# Patient Record
Sex: MALE | Race: BLACK OR AFRICAN AMERICAN | Employment: OTHER | URBAN - METROPOLITAN AREA
[De-identification: names, ages, dates, MRNs, and addresses within clinical notes are randomized per-mention and may not be internally consistent; named-entity substitution may affect disease eponyms.]

---

## 2020-07-09 ENCOUNTER — TELEPHONE (OUTPATIENT)
Dept: GASTROENTEROLOGY | Facility: CLINIC | Age: 35
End: 2020-07-09

## 2020-07-09 ENCOUNTER — LAB ENCOUNTER (OUTPATIENT)
Dept: LAB | Age: 35
End: 2020-07-09
Attending: INTERNAL MEDICINE
Payer: COMMERCIAL

## 2020-07-09 ENCOUNTER — OFFICE VISIT (OUTPATIENT)
Dept: GASTROENTEROLOGY | Facility: CLINIC | Age: 35
End: 2020-07-09
Payer: COMMERCIAL

## 2020-07-09 VITALS
DIASTOLIC BLOOD PRESSURE: 83 MMHG | BODY MASS INDEX: 19.64 KG/M2 | WEIGHT: 153 LBS | HEART RATE: 76 BPM | HEIGHT: 74 IN | SYSTOLIC BLOOD PRESSURE: 124 MMHG

## 2020-07-09 DIAGNOSIS — K51.919 ULCERATIVE COLITIS WITH COMPLICATION, UNSPECIFIED LOCATION (HCC): ICD-10-CM

## 2020-07-09 DIAGNOSIS — R19.7 BLOODY DIARRHEA: ICD-10-CM

## 2020-07-09 DIAGNOSIS — K51.919 ULCERATIVE COLITIS WITH COMPLICATION, UNSPECIFIED LOCATION (HCC): Primary | ICD-10-CM

## 2020-07-09 DIAGNOSIS — R19.7 BLOODY DIARRHEA: Primary | ICD-10-CM

## 2020-07-09 LAB
ALBUMIN SERPL-MCNC: 3.3 G/DL (ref 3.4–5)
ALBUMIN/GLOB SERPL: 0.6 {RATIO} (ref 1–2)
ALP LIVER SERPL-CCNC: 60 U/L (ref 45–117)
ALT SERPL-CCNC: 16 U/L (ref 16–61)
ANION GAP SERPL CALC-SCNC: 3 MMOL/L (ref 0–18)
AST SERPL-CCNC: 12 U/L (ref 15–37)
BASOPHILS # BLD AUTO: 0.07 X10(3) UL (ref 0–0.2)
BASOPHILS NFR BLD AUTO: 0.6 %
BILIRUB SERPL-MCNC: 0.3 MG/DL (ref 0.1–2)
BUN BLD-MCNC: 7 MG/DL (ref 7–18)
BUN/CREAT SERPL: 5.2 (ref 10–20)
CALCIUM BLD-MCNC: 9.1 MG/DL (ref 8.5–10.1)
CHLORIDE SERPL-SCNC: 104 MMOL/L (ref 98–112)
CO2 SERPL-SCNC: 31 MMOL/L (ref 21–32)
CREAT BLD-MCNC: 1.35 MG/DL (ref 0.7–1.3)
CRP SERPL-MCNC: 5.17 MG/DL (ref ?–0.3)
DEPRECATED HBV CORE AB SER IA-ACNC: 42.7 NG/ML (ref 48–420)
DEPRECATED RDW RBC AUTO: 39.6 FL (ref 35.1–46.3)
EOSINOPHIL # BLD AUTO: 0.41 X10(3) UL (ref 0–0.7)
EOSINOPHIL NFR BLD AUTO: 3.4 %
ERYTHROCYTE [DISTWIDTH] IN BLOOD BY AUTOMATED COUNT: 14.2 % (ref 11–15)
GLOBULIN PLAS-MCNC: 5.1 G/DL (ref 2.8–4.4)
GLUCOSE BLD-MCNC: 81 MG/DL (ref 70–99)
HAV AB SER QL IA: NONREACTIVE
HBV CORE AB SERPL QL IA: NONREACTIVE
HBV SURFACE AB SER QL: REACTIVE
HBV SURFACE AB SERPL IA-ACNC: 31.51 MIU/ML
HBV SURFACE AG SER-ACNC: <0.1 [IU]/L
HBV SURFACE AG SERPL QL IA: NONREACTIVE
HCT VFR BLD AUTO: 41.5 % (ref 39–53)
HCV AB SERPL QL IA: NONREACTIVE
HGB BLD-MCNC: 12.9 G/DL (ref 13–17.5)
IMM GRANULOCYTES # BLD AUTO: 0.09 X10(3) UL (ref 0–1)
IMM GRANULOCYTES NFR BLD: 0.7 %
IRON SATURATION: 11 % (ref 20–50)
IRON SERPL-MCNC: 33 UG/DL (ref 65–175)
LYMPHOCYTES # BLD AUTO: 2.07 X10(3) UL (ref 1–4)
LYMPHOCYTES NFR BLD AUTO: 17.1 %
M PROTEIN MFR SERPL ELPH: 8.4 G/DL (ref 6.4–8.2)
MCH RBC QN AUTO: 24.1 PG (ref 26–34)
MCHC RBC AUTO-ENTMCNC: 31.1 G/DL (ref 31–37)
MCV RBC AUTO: 77.4 FL (ref 80–100)
MONOCYTES # BLD AUTO: 0.89 X10(3) UL (ref 0.1–1)
MONOCYTES NFR BLD AUTO: 7.3 %
NEUTROPHILS # BLD AUTO: 8.58 X10 (3) UL (ref 1.5–7.7)
NEUTROPHILS # BLD AUTO: 8.58 X10(3) UL (ref 1.5–7.7)
NEUTROPHILS NFR BLD AUTO: 70.9 %
OSMOLALITY SERPL CALC.SUM OF ELEC: 283 MOSM/KG (ref 275–295)
PATIENT FASTING Y/N/NP: NO
PLATELET # BLD AUTO: 509 10(3)UL (ref 150–450)
POTASSIUM SERPL-SCNC: 3.8 MMOL/L (ref 3.5–5.1)
RBC # BLD AUTO: 5.36 X10(6)UL (ref 4.3–5.7)
SODIUM SERPL-SCNC: 138 MMOL/L (ref 136–145)
TOTAL IRON BINDING CAPACITY: 305 UG/DL (ref 240–450)
TRANSFERRIN SERPL-MCNC: 205 MG/DL (ref 200–360)
TSI SER-ACNC: 0.7 MIU/ML (ref 0.36–3.74)
VIT B12 SERPL-MCNC: 446 PG/ML (ref 193–986)
WBC # BLD AUTO: 12.1 X10(3) UL (ref 4–11)

## 2020-07-09 PROCEDURE — 82306 VITAMIN D 25 HYDROXY: CPT

## 2020-07-09 PROCEDURE — 83540 ASSAY OF IRON: CPT

## 2020-07-09 PROCEDURE — 83993 ASSAY FOR CALPROTECTIN FECAL: CPT | Performed by: INTERNAL MEDICINE

## 2020-07-09 PROCEDURE — 99204 OFFICE O/P NEW MOD 45 MIN: CPT | Performed by: INTERNAL MEDICINE

## 2020-07-09 PROCEDURE — 82607 VITAMIN B-12: CPT

## 2020-07-09 PROCEDURE — 87493 C DIFF AMPLIFIED PROBE: CPT

## 2020-07-09 PROCEDURE — 86480 TB TEST CELL IMMUN MEASURE: CPT

## 2020-07-09 PROCEDURE — 80053 COMPREHEN METABOLIC PANEL: CPT

## 2020-07-09 PROCEDURE — 86704 HEP B CORE ANTIBODY TOTAL: CPT

## 2020-07-09 PROCEDURE — 86708 HEPATITIS A ANTIBODY: CPT

## 2020-07-09 PROCEDURE — 87427 SHIGA-LIKE TOXIN AG IA: CPT

## 2020-07-09 PROCEDURE — 84466 ASSAY OF TRANSFERRIN: CPT

## 2020-07-09 PROCEDURE — 86140 C-REACTIVE PROTEIN: CPT

## 2020-07-09 PROCEDURE — 87077 CULTURE AEROBIC IDENTIFY: CPT

## 2020-07-09 PROCEDURE — 36415 COLL VENOUS BLD VENIPUNCTURE: CPT

## 2020-07-09 PROCEDURE — 86803 HEPATITIS C AB TEST: CPT

## 2020-07-09 PROCEDURE — 84443 ASSAY THYROID STIM HORMONE: CPT

## 2020-07-09 PROCEDURE — 81401 MOPATH PROCEDURE LEVEL 2: CPT

## 2020-07-09 PROCEDURE — 86706 HEP B SURFACE ANTIBODY: CPT

## 2020-07-09 PROCEDURE — 87045 FECES CULTURE AEROBIC BACT: CPT

## 2020-07-09 PROCEDURE — 82657 ENZYME CELL ACTIVITY: CPT | Performed by: INTERNAL MEDICINE

## 2020-07-09 PROCEDURE — 87340 HEPATITIS B SURFACE AG IA: CPT

## 2020-07-09 PROCEDURE — 85025 COMPLETE CBC W/AUTO DIFF WBC: CPT

## 2020-07-09 PROCEDURE — 87046 STOOL CULTR AEROBIC BACT EA: CPT

## 2020-07-09 PROCEDURE — 82728 ASSAY OF FERRITIN: CPT

## 2020-07-09 RX ORDER — LICORICE,DEGLYCYRRHIZINATED
POWDER (GRAM) MISCELLANEOUS
COMMUNITY

## 2020-07-09 NOTE — PATIENT INSTRUCTIONS
1. Schedule colonoscopy with IV sedation[Diagnosis: bloody diarrhea]    2.  bowel prep from pharmacy (miralax/gatorade)    3. Continue all medications for procedure    4. Read all bowel prep instructions carefully    5.  AVOID seeds, nuts, popcorn, r

## 2020-07-09 NOTE — H&P
[address] [facility] [place] - Gastroenterology                                                                                                               Reason for consult: c History, Medications, Allergies, ROS:      Past Medical History:   Diagnosis Date   • Ulcerative colitis [facility])       Past Surgical History:   Procedure Laterality Date   • COLONOSCOPY  2015    @ [facility]      Family Hx:   Family History   Problem Relat cyanosis, clubbing or edema is evident. Neuro- Alert and interactive, and gross movements of extremities normal    Labs/Imaging:     Patient's pertinent labs and imaging were reviewed and discussed with patient today.       .  ASSESSMENT/PLAN:   Naida Lozano carefully    5. AVOID seeds, nuts, popcorn, raw fruits and vegetables (cooked is okay) for 2-3 days before procedure    6. Blood test/stool tests    7. [place] diet. No dairy or caffeine. Soft foods only.     Colonoscopy consent: I have discussed the risks, b

## 2020-07-09 NOTE — TELEPHONE ENCOUNTER
Scheduled for:  Colonoscopy 96066  Provider Name: Dr Zahida Landis  Date: Morgan Knutson 7/14/2020   Location: [facility]   Sedation: IV    Time: 12:30 am   Prep: split dose miralax/gatorade  Meds/Allergies Reconciled?: NKDA  Diagnosis with codes: bloody diarrhea R19.7, UC K59.919

## 2020-07-10 ENCOUNTER — TELEPHONE (OUTPATIENT)
Dept: GASTROENTEROLOGY | Facility: CLINIC | Age: 35
End: 2020-07-10

## 2020-07-10 PROBLEM — K51.811 OTHER ULCERATIVE COLITIS WITH RECTAL BLEEDING (HCC): Status: ACTIVE | Noted: 2020-07-10

## 2020-07-10 PROBLEM — R19.7 BLOODY DIARRHEA: Status: ACTIVE | Noted: 2020-07-10

## 2020-07-10 PROBLEM — K51.919 ULCERATIVE COLITIS WITH COMPLICATION (HCC): Status: ACTIVE | Noted: 2020-07-10

## 2020-07-10 LAB
25(OH)D3 SERPL-MCNC: 17 NG/ML (ref 30–100)
C DIFF TOX B STL QL: NEGATIVE

## 2020-07-10 RX ORDER — ERGOCALCIFEROL 1.25 MG/1
50000 CAPSULE ORAL
Qty: 8 CAPSULE | Refills: 0 | Status: SHIPPED | OUTPATIENT
Start: 2020-07-10 | End: 2020-08-29

## 2020-07-10 RX ORDER — PREDNISONE 20 MG/1
20 TABLET ORAL DAILY
Qty: 30 TABLET | Refills: 1 | COMMUNITY
Start: 2020-07-10

## 2020-07-10 NOTE — TELEPHONE ENCOUNTER
D/w patient--->negative cdiff. Likely UC flare. Start prednisone taper as below. Called it in to his pharmacy and went over instructions with patient. May make his acne worse. Low Vit D. Will give weekly high dose tablet x 8 weeks.     See me next week for

## 2020-07-11 LAB
M TB IFN-G CD4+ T-CELLS BLD-ACNC: 0.03 IU/ML
M TB TUBERC IFN-G BLD QL: NEGATIVE
M TB TUBERC IGNF/MITOGEN IGNF CONTROL: 7.42 IU/ML
QUANTIFERON TB1 MINUS NIL: -0.01 IU/ML
QUANTIFERON TB2 MINUS NIL: -0.01 IU/ML

## 2020-07-13 ENCOUNTER — LAB ENCOUNTER (OUTPATIENT)
Dept: LAB | Facility: HOSPITAL | Age: 35
End: 2020-07-13
Attending: INTERNAL MEDICINE
Payer: COMMERCIAL

## 2020-07-13 DIAGNOSIS — K51.919: ICD-10-CM

## 2020-07-13 DIAGNOSIS — R19.7 BLOODY DIARRHEA: ICD-10-CM

## 2020-07-13 DIAGNOSIS — Z01.818 PRE-OP TESTING: ICD-10-CM

## 2020-07-13 LAB
SARS-COV-2 RNA RESP QL NAA+PROBE: NOT DETECTED
THIOPURINE METHYLTRANSFERASE: 30 U/ML

## 2020-07-14 ENCOUNTER — HOSPITAL ENCOUNTER (OUTPATIENT)
Facility: HOSPITAL | Age: 35
Setting detail: HOSPITAL OUTPATIENT SURGERY
Discharge: HOME | End: 2020-07-14
Attending: INTERNAL MEDICINE | Admitting: INTERNAL MEDICINE
Payer: COMMERCIAL

## 2020-07-14 ENCOUNTER — TELEPHONE (OUTPATIENT)
Dept: GASTROENTEROLOGY | Facility: CLINIC | Age: 35
End: 2020-07-14

## 2020-07-14 DIAGNOSIS — K51.919: ICD-10-CM

## 2020-07-14 DIAGNOSIS — K51.911 ULCERATIVE COLITIS WITH RECTAL BLEEDING, UNSPECIFIED LOCATION (HCC): Primary | ICD-10-CM

## 2020-07-14 DIAGNOSIS — K51.919 ULCERATIVE COLITIS WITH COMPLICATION, UNSPECIFIED LOCATION (HCC): ICD-10-CM

## 2020-07-14 DIAGNOSIS — R19.7 BLOODY DIARRHEA: Primary | ICD-10-CM

## 2020-07-14 DIAGNOSIS — Z01.818 PRE-OP TESTING: ICD-10-CM

## 2020-07-14 PROBLEM — K52.9 COLITIS: Status: ACTIVE | Noted: 2020-07-10

## 2020-07-14 PROCEDURE — 0DBK8ZX EXCISION OF ASCENDING COLON, VIA NATURAL OR ARTIFICIAL OPENING ENDOSCOPIC, DIAGNOSTIC: ICD-10-PCS | Performed by: INTERNAL MEDICINE

## 2020-07-14 PROCEDURE — G0500 MOD SEDAT ENDO SERVICE >5YRS: HCPCS | Performed by: INTERNAL MEDICINE

## 2020-07-14 PROCEDURE — 0DBM8ZX EXCISION OF DESCENDING COLON, VIA NATURAL OR ARTIFICIAL OPENING ENDOSCOPIC, DIAGNOSTIC: ICD-10-PCS | Performed by: INTERNAL MEDICINE

## 2020-07-14 PROCEDURE — 45380 COLONOSCOPY AND BIOPSY: CPT | Performed by: INTERNAL MEDICINE

## 2020-07-14 RX ORDER — MIDAZOLAM HYDROCHLORIDE 1 MG/ML
1 INJECTION INTRAMUSCULAR; INTRAVENOUS EVERY 5 MIN PRN
Status: DISCONTINUED | OUTPATIENT
Start: 2020-07-14 | End: 2020-07-14

## 2020-07-14 RX ORDER — MIDAZOLAM HYDROCHLORIDE 1 MG/ML
INJECTION INTRAMUSCULAR; INTRAVENOUS
Status: DISCONTINUED | OUTPATIENT
Start: 2020-07-14 | End: 2020-07-14

## 2020-07-14 RX ORDER — SODIUM CHLORIDE, SODIUM LACTATE, POTASSIUM CHLORIDE, CALCIUM CHLORIDE 600; 310; 30; 20 MG/100ML; MG/100ML; MG/100ML; MG/100ML
INJECTION, SOLUTION INTRAVENOUS CONTINUOUS
Status: DISCONTINUED | OUTPATIENT
Start: 2020-07-14 | End: 2020-07-14

## 2020-07-14 RX ORDER — AZATHIOPRINE 50 MG/1
50 TABLET ORAL DAILY
Qty: 30 TABLET | Refills: 1 | Status: SHIPPED | OUTPATIENT
Start: 2020-07-14 | End: 2020-07-23

## 2020-07-14 RX ORDER — SODIUM CHLORIDE 0.9 % (FLUSH) 0.9 %
10 SYRINGE (ML) INJECTION AS NEEDED
Status: DISCONTINUED | OUTPATIENT
Start: 2020-07-14 | End: 2020-07-14

## 2020-07-14 NOTE — TELEPHONE ENCOUNTER
Dr. Falguni Echevarria-    Patient has [employer] O and they do not cover Remicade. Their preferred IV infusion is RENFLEXIS. If OK with you, please review and sign pended order, thank you.

## 2020-07-14 NOTE — OPERATIVE REPORT
COLONOSCOPY REPORT    Robertlatha LozanoKya     1985 Age 28year old   PCP Mimi Flores MD Endoscopist Cullen Werner MD     Date of procedure: 20    Procedure: Colonoscopy w/cold biopsy    Pre-operative diagnosis: Bloody Diarrhea    Post-o abrupt transition to normal mucosa in the mid-transverse colon and the remaining mucosa in the proximal transverse and ascending colon appears normal. There is mild granularity around the appendiceal orifice which is likely a cecal patch.      2. Diverticul

## 2020-07-14 NOTE — TELEPHONE ENCOUNTER
Patient wants to proceed with infliximab therapy. Risks/benefits discussed. Also start imuran 50mg/day, sent to pharmacy. Labs ordered to be done in 7-10 days. Continue prednisone taper.      RISK OF BIOLOGICS:  We discussed the risks and benefits of starti

## 2020-07-14 NOTE — H&P
History & Physical Examination    Patient Name: Cecilia Rayo  MRN: [id number]  CSN: [id number]  YOB: 1985    Diagnosis: bloody diarrhea, weight loss    predniSONE 20 MG Oral Tab, , Disp: 30 tablet, Rfl: 1, PRN  ergocalciferol 1.25 MG ([id number]

## 2020-07-15 ENCOUNTER — TELEPHONE (OUTPATIENT)
Dept: CASE MANAGEMENT | Age: 35
End: 2020-07-15

## 2020-07-15 VITALS
HEART RATE: 57 BPM | SYSTOLIC BLOOD PRESSURE: 102 MMHG | WEIGHT: 153 LBS | DIASTOLIC BLOOD PRESSURE: 68 MMHG | RESPIRATION RATE: 14 BRPM | OXYGEN SATURATION: 98 % | HEIGHT: 74 IN | TEMPERATURE: 98 F | BODY MASS INDEX: 19.64 KG/M2

## 2020-07-15 NOTE — TELEPHONE ENCOUNTER
Noted. Jaye De Luna , let me know if I need a letter for authorization of the biosimilar to infliximab.  THx

## 2020-07-15 NOTE — TELEPHONE ENCOUNTER
Hi Dr. Sierra Lebron is authorized to go to [address] [place] [address]. [facility] does not do prior auth's for medication.     Clinical staff will have to obtain authorization for patient's medication/injections    Thank you  Sher Reynolds

## 2020-07-16 ENCOUNTER — TELEPHONE (OUTPATIENT)
Dept: GASTROENTEROLOGY | Facility: CLINIC | Age: 35
End: 2020-07-16

## 2020-07-16 LAB — CALPROTECTIN STL-MCNT: >800 ΜG/G (ref ?–50)

## 2020-07-16 NOTE — TELEPHONE ENCOUNTER
Addended by: Abilio Don on: 1/22/2019 10:06 AM     Modules accepted: Orders DUPLICATE TE, please refer to TE from 7/14/2020 as biologic infusion medication PA's are NOT done by clinical staff. Message sent to the PPD PA Team to address.

## 2020-07-16 NOTE — TELEPHONE ENCOUNTER
D/w patient re: c-scope path showing IBD type colitis. Move forward with biologic, he understands it is a bio similar to infliximab. Pending approval. Risk/benefits discussed.     Chao Arteaga

## 2020-07-16 NOTE — TELEPHONE ENCOUNTER
PPD PA Team-    Please see referral documentation ([id number]) from managed care, please complete PA for medication RENFLEXIS as this is not done by clinical staff, thank you.

## 2020-07-17 NOTE — TELEPHONE ENCOUNTER
I have submitted the case via the [facility] website. Case is pending review. Supporting clinical information was attached electronically.

## 2020-07-21 ENCOUNTER — LAB ENCOUNTER (OUTPATIENT)
Dept: LAB | Age: 35
End: 2020-07-21
Attending: INTERNAL MEDICINE
Payer: COMMERCIAL

## 2020-07-21 DIAGNOSIS — K51.911 ULCERATIVE COLITIS WITH RECTAL BLEEDING, UNSPECIFIED LOCATION (HCC): ICD-10-CM

## 2020-07-21 LAB
ALBUMIN SERPL-MCNC: 3 G/DL (ref 3.4–5)
ALBUMIN/GLOB SERPL: 0.7 {RATIO} (ref 1–2)
ALP LIVER SERPL-CCNC: 67 U/L (ref 45–117)
ALT SERPL-CCNC: 34 U/L (ref 16–61)
ANION GAP SERPL CALC-SCNC: 6 MMOL/L (ref 0–18)
AST SERPL-CCNC: 13 U/L (ref 15–37)
BASOPHILS # BLD AUTO: 0.05 X10(3) UL (ref 0–0.2)
BASOPHILS NFR BLD AUTO: 0.3 %
BILIRUB SERPL-MCNC: 0.1 MG/DL (ref 0.1–2)
BUN BLD-MCNC: 9 MG/DL (ref 7–18)
BUN/CREAT SERPL: 8.7 (ref 10–20)
CALCIUM BLD-MCNC: 8.6 MG/DL (ref 8.5–10.1)
CHLORIDE SERPL-SCNC: 103 MMOL/L (ref 98–112)
CO2 SERPL-SCNC: 31 MMOL/L (ref 21–32)
CREAT BLD-MCNC: 1.04 MG/DL (ref 0.7–1.3)
DEPRECATED RDW RBC AUTO: 40.6 FL (ref 35.1–46.3)
EOSINOPHIL # BLD AUTO: 0 X10(3) UL (ref 0–0.7)
EOSINOPHIL NFR BLD AUTO: 0 %
ERYTHROCYTE [DISTWIDTH] IN BLOOD BY AUTOMATED COUNT: 14.7 % (ref 11–15)
GLOBULIN PLAS-MCNC: 4.1 G/DL (ref 2.8–4.4)
GLUCOSE BLD-MCNC: 131 MG/DL (ref 70–99)
HCT VFR BLD AUTO: 40.2 % (ref 39–53)
HGB BLD-MCNC: 12.7 G/DL (ref 13–17.5)
IMM GRANULOCYTES # BLD AUTO: 0.33 X10(3) UL (ref 0–1)
IMM GRANULOCYTES NFR BLD: 1.7 %
LYMPHOCYTES # BLD AUTO: 1.27 X10(3) UL (ref 1–4)
LYMPHOCYTES NFR BLD AUTO: 6.5 %
M PROTEIN MFR SERPL ELPH: 7.1 G/DL (ref 6.4–8.2)
MCH RBC QN AUTO: 24.5 PG (ref 26–34)
MCHC RBC AUTO-ENTMCNC: 31.6 G/DL (ref 31–37)
MCV RBC AUTO: 77.5 FL (ref 80–100)
MONOCYTES # BLD AUTO: 0.91 X10(3) UL (ref 0.1–1)
MONOCYTES NFR BLD AUTO: 4.7 %
NEUTROPHILS # BLD AUTO: 16.89 X10 (3) UL (ref 1.5–7.7)
NEUTROPHILS # BLD AUTO: 16.89 X10(3) UL (ref 1.5–7.7)
NEUTROPHILS NFR BLD AUTO: 86.8 %
OSMOLALITY SERPL CALC.SUM OF ELEC: 290 MOSM/KG (ref 275–295)
PATIENT FASTING Y/N/NP: YES
PLATELET # BLD AUTO: 577 10(3)UL (ref 150–450)
POTASSIUM SERPL-SCNC: 3.7 MMOL/L (ref 3.5–5.1)
RBC # BLD AUTO: 5.19 X10(6)UL (ref 4.3–5.7)
SODIUM SERPL-SCNC: 140 MMOL/L (ref 136–145)
WBC # BLD AUTO: 19.5 X10(3) UL (ref 4–11)

## 2020-07-21 PROCEDURE — 80053 COMPREHEN METABOLIC PANEL: CPT

## 2020-07-21 PROCEDURE — 85025 COMPLETE CBC W/AUTO DIFF WBC: CPT

## 2020-07-21 PROCEDURE — 36415 COLL VENOUS BLD VENIPUNCTURE: CPT

## 2020-07-21 NOTE — TELEPHONE ENCOUNTER
Lavell/Khushboo called to request further clinical information for prior auth. He needs to know if pt tried corticosteroids or if he had adverse effects. He also needs documentation of Castillo score. Please fax to [phone].

## 2020-07-21 NOTE — TELEPHONE ENCOUNTER
PPD PA TEAM-     Please note request for additional clinicals to be attached to this referral for Renflexis and help address. Additionally, the subscriber to the [address] is the patient/ Naida Boland (not Jeannie Michelle).      Was the referral and

## 2020-07-23 ENCOUNTER — TELEPHONE (OUTPATIENT)
Dept: GASTROENTEROLOGY | Facility: CLINIC | Age: 35
End: 2020-07-23

## 2020-07-23 DIAGNOSIS — K51.919 ULCERATIVE COLITIS WITH COMPLICATION, UNSPECIFIED LOCATION (HCC): Primary | ICD-10-CM

## 2020-07-23 RX ORDER — AZATHIOPRINE 50 MG/1
100 TABLET ORAL DAILY
Qty: 60 TABLET | Refills: 1 | Status: SHIPPED | OUTPATIENT
Start: 2020-07-23 | End: 2020-08-06

## 2020-07-23 NOTE — TELEPHONE ENCOUNTER
Reviewed case. Patient's ID number was loaded incorrectly in Epic under the spouse. Corrected ID number from [id number] to [id number]. Pulled pending authorization up via [employer] online and confirmed that it is loaded under the correct patient.

## 2020-07-23 NOTE — TELEPHONE ENCOUNTER
Faxed letter of medical necessity and all clinical related to UC to Lavell at [phone] as directed.

## 2020-07-23 NOTE — TELEPHONE ENCOUNTER
D/w Naida, he is on AZA 50mg/day, and prednisone is down to 20mg/day. Labs appear stable. Asked him to increased to Azathioprine 100mg/day. Repeat labs ordered. He has not heard about approval for Renflexis infusion (infliximab biosimilar).  I asked him

## 2020-07-23 NOTE — TELEPHONE ENCOUNTER
Corina Mojica MD   7/23/20 10:38 AM   Note      D/w Ron Valenzuela, he is on AZA 50mg/day, and prednisone is down to 20mg/day. Labs appear stable. Asked him to increased to Azathioprine 100mg/day.  Repeat labs ordered.     He has not heard about approval for [facility]

## 2020-07-23 NOTE — TELEPHONE ENCOUNTER
VV-    Please see Dr. Viri David letter generated w/ Castillo Score information for this PA, thank you.

## 2020-07-24 NOTE — TELEPHONE ENCOUNTER
Lavell [facility] Tung/Khushboo ([phone]) called and states he received the appeal paperwork and will review and reconsider his original decision. He states he will work on it now and get back to our office ASAP.      -Awaiting RENFLEXIS appeal determinati

## 2020-07-24 NOTE — TELEPHONE ENCOUNTER
Fax received from [facility] w/ reconsideration approval for RENFLEXIS biologic, valid from 7/20/2020-12/31/2020 for 6 visits. Authorization number: [id number]. Referral updated in Epic.      RENFLEXIS order and authorization letter faxed to infusion center

## 2020-07-24 NOTE — TELEPHONE ENCOUNTER
Denial letter received from [facility] for Renflexis stating the patient: needs to try corticosteroid another medication for long term condition of bowel cuaseing panful swelling before Renflexis Injection OR, the reason you cannot take corticosteroid. \"

## 2020-07-27 NOTE — TELEPHONE ENCOUNTER
Amalia/Khushboo called with approval for Renflexis. PA # is [id number] - good until 7/20/20 - 12/31/20.

## 2020-07-27 NOTE — TELEPHONE ENCOUNTER
Pt contacted and informed of RENFLEXIS approval and consented for our office to appeal with [employer] if denial continues to occur when re-certifying this medication in the future.  He will wait phone call from infusion center and if does not hear from them

## 2020-07-28 ENCOUNTER — TELEPHONE (OUTPATIENT)
Dept: HEMATOLOGY/ONCOLOGY | Facility: HOSPITAL | Age: 35
End: 2020-07-28

## 2020-07-28 PROBLEM — K51.911 ULCERATIVE COLITIS WITH RECTAL BLEEDING (HCC): Status: ACTIVE | Noted: 2020-07-28

## 2020-07-28 RX ORDER — DIPHENHYDRAMINE HYDROCHLORIDE 50 MG/ML
25 INJECTION INTRAMUSCULAR; INTRAVENOUS ONCE
Status: CANCELLED | OUTPATIENT
Start: 2020-07-28

## 2020-07-28 RX ORDER — ACETAMINOPHEN 325 MG/1
650 TABLET ORAL ONCE
Status: CANCELLED | OUTPATIENT
Start: 2020-07-28

## 2020-07-28 RX ORDER — DIPHENHYDRAMINE HCL 25 MG
25 CAPSULE ORAL ONCE
Status: CANCELLED | OUTPATIENT
Start: 2020-07-28

## 2020-07-30 NOTE — TELEPHONE ENCOUNTER
Pt has been scheduled for RENFLEXIS infusions:  Future Appointments   Date Time Provider Dusty Diaz   8/3/2020  8:30 AM EM [facility] INFRN 4 EM CHEMO EMO   8/17/2020  8:30 AM EM CC INFRN 4 [facility] CHEMO EMO   9/14/2020  8:30 AM EM CC INFRN 3 [facility] CHEMO EMO

## 2020-07-30 NOTE — TELEPHONE ENCOUNTER
Pt has been contacted by infusion center and scheduled for RENFLEXIS:  Future Appointments   Date Time Provider Dusty Diaz   8/3/2020  8:30 AM EM [facility] INFRN 4 [facility] CHEMO EMO   8/17/2020  8:30 AM EM CC INFRN 4 EM CHEMO EMO   9/14/2020  8:30 AM EM CC IN

## 2020-08-03 ENCOUNTER — OFFICE VISIT (OUTPATIENT)
Dept: HEMATOLOGY/ONCOLOGY | Facility: HOSPITAL | Age: 35
End: 2020-08-03
Attending: INTERNAL MEDICINE
Payer: COMMERCIAL

## 2020-08-03 VITALS
BODY MASS INDEX: 22 KG/M2 | HEART RATE: 65 BPM | TEMPERATURE: 98 F | SYSTOLIC BLOOD PRESSURE: 108 MMHG | RESPIRATION RATE: 18 BRPM | DIASTOLIC BLOOD PRESSURE: 70 MMHG | OXYGEN SATURATION: 99 % | WEIGHT: 167.63 LBS

## 2020-08-03 DIAGNOSIS — K51.911 ULCERATIVE COLITIS WITH RECTAL BLEEDING (HCC): Primary | ICD-10-CM

## 2020-08-03 PROCEDURE — 96365 THER/PROPH/DIAG IV INF INIT: CPT

## 2020-08-03 PROCEDURE — 96366 THER/PROPH/DIAG IV INF ADDON: CPT

## 2020-08-03 RX ORDER — DIPHENHYDRAMINE HCL 25 MG
CAPSULE ORAL
Status: COMPLETED
Start: 2020-08-03 | End: 2020-08-03

## 2020-08-03 RX ORDER — ACETAMINOPHEN 325 MG/1
650 TABLET ORAL ONCE
Status: CANCELLED | OUTPATIENT
Start: 2020-08-17

## 2020-08-03 RX ORDER — DIPHENHYDRAMINE HCL 25 MG
25 CAPSULE ORAL ONCE
Status: COMPLETED | OUTPATIENT
Start: 2020-08-03 | End: 2020-08-03

## 2020-08-03 RX ORDER — DIPHENHYDRAMINE HYDROCHLORIDE 50 MG/ML
25 INJECTION INTRAMUSCULAR; INTRAVENOUS ONCE
Status: CANCELLED | OUTPATIENT
Start: 2020-08-17

## 2020-08-03 RX ORDER — ACETAMINOPHEN 325 MG/1
650 TABLET ORAL ONCE
Status: COMPLETED | OUTPATIENT
Start: 2020-08-03 | End: 2020-08-03

## 2020-08-03 RX ORDER — ACETAMINOPHEN 325 MG/1
TABLET ORAL
Status: COMPLETED
Start: 2020-08-03 | End: 2020-08-03

## 2020-08-03 RX ORDER — DIPHENHYDRAMINE HCL 25 MG
25 CAPSULE ORAL ONCE
Status: CANCELLED | OUTPATIENT
Start: 2020-08-17

## 2020-08-03 RX ORDER — DIPHENHYDRAMINE HYDROCHLORIDE 50 MG/ML
25 INJECTION INTRAMUSCULAR; INTRAVENOUS ONCE
Status: DISCONTINUED | OUTPATIENT
Start: 2020-08-03 | End: 2020-08-03

## 2020-08-03 RX ADMIN — ACETAMINOPHEN 650 MG: 325 TABLET ORAL at 08:29:00

## 2020-08-03 RX ADMIN — DIPHENHYDRAMINE HCL 25 MG: 25 MG CAPSULE ORAL at 08:29:00

## 2020-08-03 NOTE — PROGRESS NOTES
Pares to infusion for Renflexis Week 0 for ulcerative colitis (Week 0, 2, 6 then Q8wks). TB neg, Hepatitis neg, see lab results.  Reviewed Krys Cabello patient education regarding managing ulcerative colitis and printed patient education brochure from Renflexis w

## 2020-08-06 ENCOUNTER — TELEPHONE (OUTPATIENT)
Dept: GASTROENTEROLOGY | Facility: CLINIC | Age: 35
End: 2020-08-06

## 2020-08-06 RX ORDER — AZATHIOPRINE 50 MG/1
100 TABLET ORAL DAILY
Qty: 60 TABLET | Refills: 3 | Status: SHIPPED | OUTPATIENT
Start: 2020-08-06 | End: 2020-09-05

## 2020-08-06 NOTE — TELEPHONE ENCOUNTER
Spoke to patient:    1. He is on prednisone 15mg a day and I asked him to continue taper, he understands  2. Azathioprine at 100mg/day, refilled med and sent to pharmacy  3.  First dose on infliximab biosimilar was on 8/3/20, he has another infusion in abou

## 2020-08-06 NOTE — TELEPHONE ENCOUNTER
Pt contacted as no SHARIF on file to speak to his wife, Jannet Navarrete, he states he gives verbal consent to speak to her regarding his medical care.  He is aware an SHARIF will be mailed to his home address that he needs to sign and mail back in order to allow his wife

## 2020-08-06 NOTE — TELEPHONE ENCOUNTER
Pts wife called with questions about medication:     Current Outpatient Medications:   •  azathioprine 50 MG Oral Tab, Take 2 tablets (100 mg total) by mouth daily. , Disp: 60 tablet, Rfl: 1    Please call.

## 2020-08-17 ENCOUNTER — OFFICE VISIT (OUTPATIENT)
Dept: HEMATOLOGY/ONCOLOGY | Facility: HOSPITAL | Age: 35
End: 2020-08-17
Attending: INTERNAL MEDICINE
Payer: COMMERCIAL

## 2020-08-17 VITALS
TEMPERATURE: 98 F | RESPIRATION RATE: 18 BRPM | HEART RATE: 67 BPM | WEIGHT: 170 LBS | SYSTOLIC BLOOD PRESSURE: 112 MMHG | DIASTOLIC BLOOD PRESSURE: 66 MMHG | OXYGEN SATURATION: 99 % | BODY MASS INDEX: 22 KG/M2

## 2020-08-17 DIAGNOSIS — K51.911 ULCERATIVE COLITIS WITH RECTAL BLEEDING (HCC): Primary | ICD-10-CM

## 2020-08-17 PROCEDURE — 96365 THER/PROPH/DIAG IV INF INIT: CPT

## 2020-08-17 PROCEDURE — 96366 THER/PROPH/DIAG IV INF ADDON: CPT

## 2020-08-17 RX ORDER — DIPHENHYDRAMINE HCL 25 MG
CAPSULE ORAL
Status: COMPLETED
Start: 2020-08-17 | End: 2020-08-17

## 2020-08-17 RX ORDER — ACETAMINOPHEN 325 MG/1
650 TABLET ORAL ONCE
Status: CANCELLED | OUTPATIENT
Start: 2020-09-14

## 2020-08-17 RX ORDER — DIPHENHYDRAMINE HYDROCHLORIDE 50 MG/ML
25 INJECTION INTRAMUSCULAR; INTRAVENOUS ONCE
Status: CANCELLED | OUTPATIENT
Start: 2020-09-14

## 2020-08-17 RX ORDER — DIPHENHYDRAMINE HCL 25 MG
25 CAPSULE ORAL ONCE
Status: CANCELLED | OUTPATIENT
Start: 2020-09-14

## 2020-08-17 RX ORDER — ACETAMINOPHEN 325 MG/1
TABLET ORAL
Status: COMPLETED
Start: 2020-08-17 | End: 2020-08-17

## 2020-08-17 RX ORDER — DIPHENHYDRAMINE HCL 25 MG
25 CAPSULE ORAL ONCE
Status: COMPLETED | OUTPATIENT
Start: 2020-08-17 | End: 2020-08-17

## 2020-08-17 RX ORDER — ACETAMINOPHEN 325 MG/1
650 TABLET ORAL ONCE
Status: COMPLETED | OUTPATIENT
Start: 2020-08-17 | End: 2020-08-17

## 2020-08-17 RX ADMIN — DIPHENHYDRAMINE HCL 25 MG: 25 MG CAPSULE ORAL at 08:56:00

## 2020-08-17 RX ADMIN — ACETAMINOPHEN 650 MG: 325 TABLET ORAL at 08:56:00

## 2020-08-17 NOTE — PROGRESS NOTES
Presents for the 2nd dose of renflexis 2 weeks apart from the first dose. Oral premedications given, PIV established with brisk blood return noted. Having less stools daily. renflexis infused with no s/s of adverse reaction noted. Post vs wnl.  PIV remove

## 2020-09-14 ENCOUNTER — OFFICE VISIT (OUTPATIENT)
Dept: HEMATOLOGY/ONCOLOGY | Facility: HOSPITAL | Age: 35
End: 2020-09-14
Attending: INTERNAL MEDICINE
Payer: COMMERCIAL

## 2020-09-14 VITALS
SYSTOLIC BLOOD PRESSURE: 106 MMHG | RESPIRATION RATE: 16 BRPM | BODY MASS INDEX: 23 KG/M2 | OXYGEN SATURATION: 98 % | HEART RATE: 62 BPM | WEIGHT: 176.63 LBS | TEMPERATURE: 98 F | DIASTOLIC BLOOD PRESSURE: 64 MMHG

## 2020-09-14 DIAGNOSIS — K51.911 ULCERATIVE COLITIS WITH RECTAL BLEEDING (HCC): Primary | ICD-10-CM

## 2020-09-14 PROCEDURE — 96366 THER/PROPH/DIAG IV INF ADDON: CPT

## 2020-09-14 PROCEDURE — 96365 THER/PROPH/DIAG IV INF INIT: CPT

## 2020-09-14 RX ORDER — DIPHENHYDRAMINE HYDROCHLORIDE 50 MG/ML
25 INJECTION INTRAMUSCULAR; INTRAVENOUS ONCE
Status: CANCELLED | OUTPATIENT
Start: 2020-11-09

## 2020-09-14 RX ORDER — DIPHENHYDRAMINE HCL 25 MG
CAPSULE ORAL
Status: COMPLETED
Start: 2020-09-14 | End: 2020-09-14

## 2020-09-14 RX ORDER — DIPHENHYDRAMINE HCL 25 MG
25 CAPSULE ORAL ONCE
Status: CANCELLED | OUTPATIENT
Start: 2020-11-09

## 2020-09-14 RX ORDER — ACETAMINOPHEN 325 MG/1
650 TABLET ORAL ONCE
Status: CANCELLED | OUTPATIENT
Start: 2020-11-09

## 2020-09-14 RX ORDER — DIPHENHYDRAMINE HCL 25 MG
25 CAPSULE ORAL ONCE
Status: COMPLETED | OUTPATIENT
Start: 2020-09-14 | End: 2020-09-14

## 2020-09-14 RX ORDER — ACETAMINOPHEN 325 MG/1
TABLET ORAL
Status: COMPLETED
Start: 2020-09-14 | End: 2020-09-14

## 2020-09-14 RX ORDER — ACETAMINOPHEN 325 MG/1
650 TABLET ORAL ONCE
Status: COMPLETED | OUTPATIENT
Start: 2020-09-14 | End: 2020-09-14

## 2020-09-14 RX ADMIN — ACETAMINOPHEN 650 MG: 325 TABLET ORAL at 08:39:00

## 2020-09-14 RX ADMIN — DIPHENHYDRAMINE HCL 25 MG: 25 MG CAPSULE ORAL at 08:39:00

## 2020-09-14 NOTE — PROGRESS NOTES
Ambulatory to dept for week 6 Reneflexis. Oral premedications given, PIV established with brisk blood return noted. Renflexis infused with no s/s of adverse reaction noted. Post vs wnl. PIV removed, site covered with 2x2g and coban. No bleeding noted.

## 2020-09-28 NOTE — TELEPHONE ENCOUNTER
Dr. Rosina Bernabe-    Please see VV/PPD PA Team message below. What is the Castillo score for this patient? Please advise, thank you. Last seen 6/30/20.

## 2020-11-09 ENCOUNTER — OFFICE VISIT (OUTPATIENT)
Dept: HEMATOLOGY/ONCOLOGY | Facility: HOSPITAL | Age: 35
End: 2020-11-09
Attending: INTERNAL MEDICINE
Payer: COMMERCIAL

## 2020-11-09 VITALS
HEART RATE: 64 BPM | DIASTOLIC BLOOD PRESSURE: 70 MMHG | OXYGEN SATURATION: 99 % | SYSTOLIC BLOOD PRESSURE: 120 MMHG | BODY MASS INDEX: 23 KG/M2 | WEIGHT: 179 LBS | RESPIRATION RATE: 16 BRPM | TEMPERATURE: 99 F

## 2020-11-09 DIAGNOSIS — K51.911 ULCERATIVE COLITIS WITH RECTAL BLEEDING (HCC): Primary | ICD-10-CM

## 2020-11-09 PROCEDURE — 96366 THER/PROPH/DIAG IV INF ADDON: CPT

## 2020-11-09 PROCEDURE — 96365 THER/PROPH/DIAG IV INF INIT: CPT

## 2020-11-09 RX ORDER — DIPHENHYDRAMINE HCL 25 MG
CAPSULE ORAL
Status: COMPLETED
Start: 2020-11-09 | End: 2020-11-09

## 2020-11-09 RX ORDER — ACETAMINOPHEN 325 MG/1
TABLET ORAL
Status: COMPLETED
Start: 2020-11-09 | End: 2020-11-09

## 2020-11-09 RX ORDER — DIPHENHYDRAMINE HCL 25 MG
25 CAPSULE ORAL ONCE
Status: CANCELLED | OUTPATIENT
Start: 2021-01-04

## 2020-11-09 RX ORDER — ACETAMINOPHEN 325 MG/1
650 TABLET ORAL ONCE
Status: CANCELLED | OUTPATIENT
Start: 2021-01-04

## 2020-11-09 RX ORDER — DIPHENHYDRAMINE HYDROCHLORIDE 50 MG/ML
25 INJECTION INTRAMUSCULAR; INTRAVENOUS ONCE
Status: CANCELLED | OUTPATIENT
Start: 2021-01-04

## 2020-11-09 RX ORDER — DIPHENHYDRAMINE HCL 25 MG
25 CAPSULE ORAL ONCE
Status: COMPLETED | OUTPATIENT
Start: 2020-11-09 | End: 2020-11-09

## 2020-11-09 RX ORDER — ACETAMINOPHEN 325 MG/1
650 TABLET ORAL ONCE
Status: COMPLETED | OUTPATIENT
Start: 2020-11-09 | End: 2020-11-09

## 2020-11-09 RX ADMIN — DIPHENHYDRAMINE HCL 25 MG: 25 MG CAPSULE ORAL at 08:21:00

## 2020-11-09 RX ADMIN — ACETAMINOPHEN 650 MG: 325 TABLET ORAL at 08:21:00

## 2020-11-09 NOTE — PROGRESS NOTES
Presents for renflexis ordered every 8 weeks. Oral premedications given, PIV established with brisk blood return noted. Tolerating tx, no concerns voiced. Renflexis infused with no s/s of adverse reaction noted. Post vs wnl.  PIV removed, site covered wit

## 2020-12-30 ENCOUNTER — TELEPHONE (OUTPATIENT)
Dept: GASTROENTEROLOGY | Facility: CLINIC | Age: 35
End: 2020-12-30

## 2021-01-04 ENCOUNTER — OFFICE VISIT (OUTPATIENT)
Dept: HEMATOLOGY/ONCOLOGY | Facility: HOSPITAL | Age: 36
End: 2021-01-04
Attending: INTERNAL MEDICINE
Payer: COMMERCIAL

## 2021-01-04 VITALS
RESPIRATION RATE: 16 BRPM | WEIGHT: 176.81 LBS | BODY MASS INDEX: 23 KG/M2 | OXYGEN SATURATION: 100 % | HEART RATE: 61 BPM | TEMPERATURE: 98 F | SYSTOLIC BLOOD PRESSURE: 104 MMHG | DIASTOLIC BLOOD PRESSURE: 66 MMHG

## 2021-01-04 DIAGNOSIS — K51.919 ULCERATIVE COLITIS WITH COMPLICATION, UNSPECIFIED LOCATION (HCC): ICD-10-CM

## 2021-01-04 DIAGNOSIS — K51.911 ULCERATIVE COLITIS WITH RECTAL BLEEDING (HCC): Primary | ICD-10-CM

## 2021-01-04 LAB
ALBUMIN SERPL-MCNC: 3.6 G/DL (ref 3.4–5)
ALBUMIN/GLOB SERPL: 0.8 {RATIO} (ref 1–2)
ALP LIVER SERPL-CCNC: 67 U/L
ALT SERPL-CCNC: 21 U/L
ANION GAP SERPL CALC-SCNC: 5 MMOL/L (ref 0–18)
AST SERPL-CCNC: 20 U/L (ref 15–37)
BASOPHILS # BLD AUTO: 0.04 X10(3) UL (ref 0–0.2)
BASOPHILS NFR BLD AUTO: 0.5 %
BILIRUB SERPL-MCNC: 0.3 MG/DL (ref 0.1–2)
BUN BLD-MCNC: 17 MG/DL (ref 7–18)
BUN/CREAT SERPL: 12.9 (ref 10–20)
CALCIUM BLD-MCNC: 8.7 MG/DL (ref 8.5–10.1)
CHLORIDE SERPL-SCNC: 106 MMOL/L (ref 98–112)
CO2 SERPL-SCNC: 29 MMOL/L (ref 21–32)
CREAT BLD-MCNC: 1.32 MG/DL
DEPRECATED RDW RBC AUTO: 46.5 FL (ref 35.1–46.3)
EOSINOPHIL # BLD AUTO: 0.16 X10(3) UL (ref 0–0.7)
EOSINOPHIL NFR BLD AUTO: 2 %
ERYTHROCYTE [DISTWIDTH] IN BLOOD BY AUTOMATED COUNT: 15.3 % (ref 11–15)
GLOBULIN PLAS-MCNC: 4.3 G/DL (ref 2.8–4.4)
GLUCOSE BLD-MCNC: 96 MG/DL (ref 70–99)
HCT VFR BLD AUTO: 42.5 %
HGB BLD-MCNC: 13.6 G/DL
IMM GRANULOCYTES # BLD AUTO: 0.03 X10(3) UL (ref 0–1)
IMM GRANULOCYTES NFR BLD: 0.4 %
LYMPHOCYTES # BLD AUTO: 2.05 X10(3) UL (ref 1–4)
LYMPHOCYTES NFR BLD AUTO: 25.6 %
M PROTEIN MFR SERPL ELPH: 7.9 G/DL (ref 6.4–8.2)
MCH RBC QN AUTO: 26.5 PG (ref 26–34)
MCHC RBC AUTO-ENTMCNC: 32 G/DL (ref 31–37)
MCV RBC AUTO: 82.7 FL
MONOCYTES # BLD AUTO: 0.63 X10(3) UL (ref 0.1–1)
MONOCYTES NFR BLD AUTO: 7.9 %
NEUTROPHILS # BLD AUTO: 5.09 X10 (3) UL (ref 1.5–7.7)
NEUTROPHILS # BLD AUTO: 5.09 X10(3) UL (ref 1.5–7.7)
NEUTROPHILS NFR BLD AUTO: 63.6 %
OSMOLALITY SERPL CALC.SUM OF ELEC: 291 MOSM/KG (ref 275–295)
PLATELET # BLD AUTO: 378 10(3)UL (ref 150–450)
POTASSIUM SERPL-SCNC: 3.8 MMOL/L (ref 3.5–5.1)
RBC # BLD AUTO: 5.14 X10(6)UL
SODIUM SERPL-SCNC: 140 MMOL/L (ref 136–145)
WBC # BLD AUTO: 8 X10(3) UL (ref 4–11)

## 2021-01-04 PROCEDURE — 96366 THER/PROPH/DIAG IV INF ADDON: CPT

## 2021-01-04 PROCEDURE — 85025 COMPLETE CBC W/AUTO DIFF WBC: CPT

## 2021-01-04 PROCEDURE — 96365 THER/PROPH/DIAG IV INF INIT: CPT

## 2021-01-04 PROCEDURE — 80053 COMPREHEN METABOLIC PANEL: CPT

## 2021-01-04 RX ORDER — ACETAMINOPHEN 325 MG/1
TABLET ORAL
Status: COMPLETED
Start: 2021-01-04 | End: 2021-01-04

## 2021-01-04 RX ORDER — DIPHENHYDRAMINE HCL 25 MG
25 CAPSULE ORAL ONCE
Status: CANCELLED | OUTPATIENT
Start: 2021-03-01

## 2021-01-04 RX ORDER — ACETAMINOPHEN 325 MG/1
650 TABLET ORAL ONCE
Status: COMPLETED | OUTPATIENT
Start: 2021-01-04 | End: 2021-01-04

## 2021-01-04 RX ORDER — ACETAMINOPHEN 325 MG/1
650 TABLET ORAL ONCE
Status: CANCELLED | OUTPATIENT
Start: 2021-03-01

## 2021-01-04 RX ORDER — DIPHENHYDRAMINE HCL 25 MG
25 CAPSULE ORAL ONCE
Status: COMPLETED | OUTPATIENT
Start: 2021-01-04 | End: 2021-01-04

## 2021-01-04 RX ORDER — DIPHENHYDRAMINE HYDROCHLORIDE 50 MG/ML
25 INJECTION INTRAMUSCULAR; INTRAVENOUS ONCE
Status: CANCELLED | OUTPATIENT
Start: 2021-03-01

## 2021-01-04 RX ORDER — DIPHENHYDRAMINE HCL 25 MG
CAPSULE ORAL
Status: COMPLETED
Start: 2021-01-04 | End: 2021-01-04

## 2021-01-04 RX ADMIN — ACETAMINOPHEN 650 MG: 325 TABLET ORAL at 08:56:00

## 2021-01-04 RX ADMIN — DIPHENHYDRAMINE HCL 25 MG: 25 MG CAPSULE ORAL at 08:56:00

## 2021-01-04 NOTE — PROGRESS NOTES
Presents for renflexis ordered every 8 weeks. Oral premedications given, PIV established with brisk blood return noted. Outstanding labs from Dr. Sylvia Saunders collected. Tolerating tx, no concerns voiced. Denies any recent infections or fevers.    Renflexis infu

## 2021-01-05 ENCOUNTER — TELEPHONE (OUTPATIENT)
Dept: HEMATOLOGY/ONCOLOGY | Facility: HOSPITAL | Age: 36
End: 2021-01-05

## 2021-01-05 NOTE — TELEPHONE ENCOUNTER
Spoke to Naida today to get an update on his condition post infusion for renflexis. Patient sates that he is stable and continue to do well. New labs requested and drawn.  See attached   Fax condmarthao update for [facility] @ [phone]  Phone [phone]-

## 2021-01-08 ENCOUNTER — TELEPHONE (OUTPATIENT)
Dept: GASTROENTEROLOGY | Facility: CLINIC | Age: 36
End: 2021-01-08

## 2021-01-08 NOTE — TELEPHONE ENCOUNTER
D/w patient---labs appear stable. Cr slightly up but overall stable. He remains on remicade biosimilar and imuran. We discussed he is at risk of covid given immunosuppression. Wear masks. wash hands.      GI Clinical Staff:   Can you please schedule appt for

## 2021-02-11 ENCOUNTER — OFFICE VISIT (OUTPATIENT)
Dept: GASTROENTEROLOGY | Facility: CLINIC | Age: 36
End: 2021-02-11
Payer: COMMERCIAL

## 2021-02-11 VITALS
SYSTOLIC BLOOD PRESSURE: 111 MMHG | HEART RATE: 76 BPM | DIASTOLIC BLOOD PRESSURE: 76 MMHG | HEIGHT: 74 IN | WEIGHT: 185 LBS | TEMPERATURE: 99 F | BODY MASS INDEX: 23.74 KG/M2

## 2021-02-11 DIAGNOSIS — K51.811 OTHER ULCERATIVE COLITIS WITH RECTAL BLEEDING (HCC): Primary | ICD-10-CM

## 2021-02-11 PROBLEM — K52.9 COLITIS: Status: RESOLVED | Noted: 2020-07-10 | Resolved: 2021-02-11

## 2021-02-11 PROBLEM — R19.7 BLOODY DIARRHEA: Status: RESOLVED | Noted: 2020-07-10 | Resolved: 2021-02-11

## 2021-02-11 PROCEDURE — 3074F SYST BP LT 130 MM HG: CPT | Performed by: INTERNAL MEDICINE

## 2021-02-11 PROCEDURE — 3008F BODY MASS INDEX DOCD: CPT | Performed by: INTERNAL MEDICINE

## 2021-02-11 PROCEDURE — 99214 OFFICE O/P EST MOD 30 MIN: CPT | Performed by: INTERNAL MEDICINE

## 2021-02-11 PROCEDURE — 3078F DIAST BP <80 MM HG: CPT | Performed by: INTERNAL MEDICINE

## 2021-02-11 RX ORDER — AZATHIOPRINE 50 MG/1
TABLET ORAL
COMMUNITY
Start: 2020-11-27

## 2021-02-11 NOTE — PROGRESS NOTES
[address] [facility] Follow-up Visit    Daniela months  -Having some acne issues as well and was given doxycycline oral by his PCP     Denies any dizziness, chest pain, shortness of breath. No vomiting. Able to tolerate oral intake.   His wife is accompanying him today.     Prior endoscopies:  Colonosc breath  CARDIOVASCULAR:  negative for  chest pain  GASTROINTESTINAL:  see HPI  GENITOURINARY:  negative for dysuria  INTEGUMENT/BREAST:  SKIN:  negative for  rash  ALLERGIC/IMMUNOLOGIC:  negative for hay fever  ENDOCRINE:  negative for cold intolerance and encounter. Meds This Visit:  Requested Prescriptions      No prescriptions requested or ordered in this encounter       Imaging & Referrals:  None     2/11/2021    YOLI Johnson MD  Pager: [phone]        This note was partially prepared using

## 2021-03-01 ENCOUNTER — OFFICE VISIT (OUTPATIENT)
Dept: HEMATOLOGY/ONCOLOGY | Facility: HOSPITAL | Age: 36
End: 2021-03-01
Attending: INTERNAL MEDICINE
Payer: COMMERCIAL

## 2021-03-01 VITALS
DIASTOLIC BLOOD PRESSURE: 77 MMHG | OXYGEN SATURATION: 100 % | RESPIRATION RATE: 16 BRPM | SYSTOLIC BLOOD PRESSURE: 116 MMHG | TEMPERATURE: 98 F | HEART RATE: 59 BPM

## 2021-03-01 DIAGNOSIS — K51.811 OTHER ULCERATIVE COLITIS WITH RECTAL BLEEDING (HCC): ICD-10-CM

## 2021-03-01 DIAGNOSIS — K51.911 ULCERATIVE COLITIS WITH RECTAL BLEEDING (HCC): Primary | ICD-10-CM

## 2021-03-01 LAB
ALBUMIN SERPL-MCNC: 3.8 G/DL (ref 3.4–5)
ALBUMIN/GLOB SERPL: 1 {RATIO} (ref 1–2)
ALP LIVER SERPL-CCNC: 66 U/L
ALT SERPL-CCNC: 19 U/L
ANION GAP SERPL CALC-SCNC: 3 MMOL/L (ref 0–18)
AST SERPL-CCNC: 19 U/L (ref 15–37)
BASOPHILS # BLD AUTO: 0.05 X10(3) UL (ref 0–0.2)
BASOPHILS NFR BLD AUTO: 0.7 %
BILIRUB SERPL-MCNC: 0.4 MG/DL (ref 0.1–2)
BUN BLD-MCNC: 12 MG/DL (ref 7–18)
BUN/CREAT SERPL: 10.4 (ref 10–20)
CALCIUM BLD-MCNC: 8 MG/DL (ref 8.5–10.1)
CHLORIDE SERPL-SCNC: 104 MMOL/L (ref 98–112)
CO2 SERPL-SCNC: 32 MMOL/L (ref 21–32)
CREAT BLD-MCNC: 1.15 MG/DL
CRP SERPL-MCNC: 0.45 MG/DL (ref ?–0.3)
DEPRECATED RDW RBC AUTO: 42.8 FL (ref 35.1–46.3)
EOSINOPHIL # BLD AUTO: 0.17 X10(3) UL (ref 0–0.7)
EOSINOPHIL NFR BLD AUTO: 2.2 %
ERYTHROCYTE [DISTWIDTH] IN BLOOD BY AUTOMATED COUNT: 14 % (ref 11–15)
GLOBULIN PLAS-MCNC: 4 G/DL (ref 2.8–4.4)
GLUCOSE BLD-MCNC: 102 MG/DL (ref 70–99)
HCT VFR BLD AUTO: 43.1 %
HGB BLD-MCNC: 13.9 G/DL
IMM GRANULOCYTES # BLD AUTO: 0.02 X10(3) UL (ref 0–1)
IMM GRANULOCYTES NFR BLD: 0.3 %
LYMPHOCYTES # BLD AUTO: 2.17 X10(3) UL (ref 1–4)
LYMPHOCYTES NFR BLD AUTO: 28.6 %
M PROTEIN MFR SERPL ELPH: 7.8 G/DL (ref 6.4–8.2)
MCH RBC QN AUTO: 26.8 PG (ref 26–34)
MCHC RBC AUTO-ENTMCNC: 32.3 G/DL (ref 31–37)
MCV RBC AUTO: 83 FL
MONOCYTES # BLD AUTO: 0.65 X10(3) UL (ref 0.1–1)
MONOCYTES NFR BLD AUTO: 8.6 %
NEUTROPHILS # BLD AUTO: 4.53 X10 (3) UL (ref 1.5–7.7)
NEUTROPHILS # BLD AUTO: 4.53 X10(3) UL (ref 1.5–7.7)
NEUTROPHILS NFR BLD AUTO: 59.6 %
OSMOLALITY SERPL CALC.SUM OF ELEC: 288 MOSM/KG (ref 275–295)
PATIENT FASTING Y/N/NP: NO
PLATELET # BLD AUTO: 287 10(3)UL (ref 150–450)
POTASSIUM SERPL-SCNC: 3.7 MMOL/L (ref 3.5–5.1)
RBC # BLD AUTO: 5.19 X10(6)UL
SODIUM SERPL-SCNC: 139 MMOL/L (ref 136–145)
WBC # BLD AUTO: 7.6 X10(3) UL (ref 4–11)

## 2021-03-01 PROCEDURE — 96365 THER/PROPH/DIAG IV INF INIT: CPT

## 2021-03-01 PROCEDURE — 86140 C-REACTIVE PROTEIN: CPT

## 2021-03-01 PROCEDURE — 96366 THER/PROPH/DIAG IV INF ADDON: CPT

## 2021-03-01 PROCEDURE — 80299 QUANTITATIVE ASSAY DRUG: CPT

## 2021-03-01 PROCEDURE — 80053 COMPREHEN METABOLIC PANEL: CPT

## 2021-03-01 PROCEDURE — 85025 COMPLETE CBC W/AUTO DIFF WBC: CPT

## 2021-03-01 RX ORDER — DIPHENHYDRAMINE HYDROCHLORIDE 50 MG/ML
25 INJECTION INTRAMUSCULAR; INTRAVENOUS ONCE
Status: CANCELLED | OUTPATIENT
Start: 2021-04-26

## 2021-03-01 RX ORDER — DIPHENHYDRAMINE HCL 25 MG
25 CAPSULE ORAL ONCE
Status: CANCELLED | OUTPATIENT
Start: 2021-04-26

## 2021-03-01 RX ORDER — ACETAMINOPHEN 325 MG/1
TABLET ORAL
Status: COMPLETED
Start: 2021-03-01 | End: 2021-03-01

## 2021-03-01 RX ORDER — DIPHENHYDRAMINE HCL 25 MG
25 CAPSULE ORAL ONCE
Status: COMPLETED | OUTPATIENT
Start: 2021-03-01 | End: 2021-03-01

## 2021-03-01 RX ORDER — ACETAMINOPHEN 325 MG/1
650 TABLET ORAL ONCE
Status: COMPLETED | OUTPATIENT
Start: 2021-03-01 | End: 2021-03-01

## 2021-03-01 RX ORDER — ACETAMINOPHEN 325 MG/1
650 TABLET ORAL ONCE
Status: CANCELLED | OUTPATIENT
Start: 2021-04-26

## 2021-03-01 RX ORDER — DIPHENHYDRAMINE HCL 25 MG
CAPSULE ORAL
Status: COMPLETED
Start: 2021-03-01 | End: 2021-03-01

## 2021-03-01 RX ADMIN — ACETAMINOPHEN 650 MG: 325 TABLET ORAL at 08:57:00

## 2021-03-01 RX ADMIN — DIPHENHYDRAMINE HCL 25 MG: 25 MG CAPSULE ORAL at 08:58:00

## 2021-03-01 NOTE — PROGRESS NOTES
Presents for renflexis ordered every 8 weeks. Oral premedications given, PIV established with brisk blood return noted. Patient requested labs from Dr. Etelvina Austin be collected and sent. Tolerating tx, no concerns voiced.   He states a few days before he is due

## 2021-03-06 ENCOUNTER — TELEPHONE (OUTPATIENT)
Dept: GASTROENTEROLOGY | Facility: CLINIC | Age: 36
End: 2021-03-06

## 2021-03-06 LAB
6-METHYLMERCAPTOPURINE (6-MMP): <264 PMOL/8X10(8)RBC
6-THIOGUANINE (6-GT): 22 PMOL/8X10(8)RBC

## 2021-05-03 ENCOUNTER — OFFICE VISIT (OUTPATIENT)
Dept: HEMATOLOGY/ONCOLOGY | Facility: HOSPITAL | Age: 36
End: 2021-05-03
Attending: INTERNAL MEDICINE
Payer: COMMERCIAL

## 2021-05-03 VITALS
OXYGEN SATURATION: 97 % | HEART RATE: 67 BPM | WEIGHT: 178 LBS | SYSTOLIC BLOOD PRESSURE: 110 MMHG | DIASTOLIC BLOOD PRESSURE: 59 MMHG | TEMPERATURE: 98 F | BODY MASS INDEX: 22.84 KG/M2 | RESPIRATION RATE: 16 BRPM | HEIGHT: 74 IN

## 2021-05-03 DIAGNOSIS — K51.811 OTHER ULCERATIVE COLITIS WITH RECTAL BLEEDING (HCC): Primary | ICD-10-CM

## 2021-05-03 PROCEDURE — 96366 THER/PROPH/DIAG IV INF ADDON: CPT

## 2021-05-03 PROCEDURE — 96365 THER/PROPH/DIAG IV INF INIT: CPT

## 2021-05-03 RX ORDER — DIPHENHYDRAMINE HCL 25 MG
25 CAPSULE ORAL ONCE
Status: CANCELLED | OUTPATIENT
Start: 2021-05-10

## 2021-05-03 RX ORDER — DIPHENHYDRAMINE HYDROCHLORIDE 50 MG/ML
25 INJECTION INTRAMUSCULAR; INTRAVENOUS ONCE
Status: CANCELLED | OUTPATIENT
Start: 2021-05-10

## 2021-05-03 RX ORDER — DIPHENHYDRAMINE HCL 25 MG
25 CAPSULE ORAL ONCE
Status: COMPLETED | OUTPATIENT
Start: 2021-05-03 | End: 2021-05-03

## 2021-05-03 RX ORDER — ACETAMINOPHEN 325 MG/1
650 TABLET ORAL ONCE
Status: COMPLETED | OUTPATIENT
Start: 2021-05-03 | End: 2021-05-03

## 2021-05-03 RX ORDER — ACETAMINOPHEN 325 MG/1
650 TABLET ORAL ONCE
Status: CANCELLED | OUTPATIENT
Start: 2021-05-10

## 2021-05-03 RX ORDER — DIPHENHYDRAMINE HCL 25 MG
CAPSULE ORAL
Status: COMPLETED
Start: 2021-05-03 | End: 2021-05-03

## 2021-05-03 RX ORDER — ACETAMINOPHEN 325 MG/1
TABLET ORAL
Status: COMPLETED
Start: 2021-05-03 | End: 2021-05-03

## 2021-05-03 RX ADMIN — DIPHENHYDRAMINE HCL 25 MG: 25 MG CAPSULE ORAL at 08:53:00

## 2021-05-03 RX ADMIN — ACETAMINOPHEN 650 MG: 325 TABLET ORAL at 08:53:00

## 2021-05-03 NOTE — PROGRESS NOTES
Presents for renflexis ordered every 8 weeks ( 1 week late due to work). Oral premedications given, PIV established with brisk blood return noted to right ac- site covered during infusion per patient preference. Tolerating tx, no concerns voiced.  [facility]

## 2021-06-21 ENCOUNTER — APPOINTMENT (OUTPATIENT)
Dept: HEMATOLOGY/ONCOLOGY | Facility: HOSPITAL | Age: 36
End: 2021-06-21
Attending: INTERNAL MEDICINE
Payer: COMMERCIAL

## 2021-06-28 ENCOUNTER — OFFICE VISIT (OUTPATIENT)
Dept: HEMATOLOGY/ONCOLOGY | Facility: HOSPITAL | Age: 36
End: 2021-06-28
Attending: INTERNAL MEDICINE
Payer: COMMERCIAL

## 2021-06-28 VITALS
RESPIRATION RATE: 16 BRPM | BODY MASS INDEX: 23.23 KG/M2 | HEIGHT: 74.02 IN | TEMPERATURE: 98 F | WEIGHT: 181 LBS | HEART RATE: 56 BPM | OXYGEN SATURATION: 100 % | DIASTOLIC BLOOD PRESSURE: 60 MMHG | SYSTOLIC BLOOD PRESSURE: 106 MMHG

## 2021-06-28 DIAGNOSIS — K51.811 OTHER ULCERATIVE COLITIS WITH RECTAL BLEEDING (HCC): Primary | ICD-10-CM

## 2021-06-28 PROCEDURE — 96413 CHEMO IV INFUSION 1 HR: CPT

## 2021-06-28 PROCEDURE — 96415 CHEMO IV INFUSION ADDL HR: CPT

## 2021-06-28 RX ORDER — DIPHENHYDRAMINE HCL 25 MG
CAPSULE ORAL
Status: COMPLETED
Start: 2021-06-28 | End: 2021-06-28

## 2021-06-28 RX ORDER — DIPHENHYDRAMINE HCL 25 MG
25 CAPSULE ORAL ONCE
Status: CANCELLED | OUTPATIENT
Start: 2021-07-05

## 2021-06-28 RX ORDER — ACETAMINOPHEN 325 MG/1
650 TABLET ORAL ONCE
Status: COMPLETED | OUTPATIENT
Start: 2021-06-28 | End: 2021-06-28

## 2021-06-28 RX ORDER — ACETAMINOPHEN 325 MG/1
650 TABLET ORAL ONCE
Status: CANCELLED | OUTPATIENT
Start: 2021-07-05

## 2021-06-28 RX ORDER — DIPHENHYDRAMINE HCL 25 MG
25 CAPSULE ORAL ONCE
Status: COMPLETED | OUTPATIENT
Start: 2021-06-28 | End: 2021-06-28

## 2021-06-28 RX ORDER — ACETAMINOPHEN 325 MG/1
TABLET ORAL
Status: COMPLETED
Start: 2021-06-28 | End: 2021-06-28

## 2021-06-28 RX ORDER — DIPHENHYDRAMINE HYDROCHLORIDE 50 MG/ML
25 INJECTION INTRAMUSCULAR; INTRAVENOUS ONCE
Status: CANCELLED | OUTPATIENT
Start: 2021-07-05

## 2021-06-28 RX ADMIN — ACETAMINOPHEN 650 MG: 325 TABLET ORAL at 08:59:00

## 2021-06-28 RX ADMIN — DIPHENHYDRAMINE HCL 25 MG: 25 MG CAPSULE ORAL at 08:59:00

## 2021-06-28 NOTE — PROGRESS NOTES
Presents for renflexis ordered every 8 weeks for ulcerative colitis . States he has been feeling well. Denies pain,diarrhea or any other GI problems at this time. He does state when it is about time for his next infusion he starts having excess gas.  He

## 2021-08-05 ENCOUNTER — TELEPHONE (OUTPATIENT)
Dept: GASTROENTEROLOGY | Facility: CLINIC | Age: 36
End: 2021-08-05

## 2021-08-05 NOTE — TELEPHONE ENCOUNTER
Dr. Kerry Bhakta,    Please review and sign off on Renflexis therapy plan if appropriate. Thank you.

## 2021-08-10 NOTE — TELEPHONE ENCOUNTER
Patient returned call and informed that we are waiting for orders to be signed off and will notify the infusion center. Patient verbalized understanding.

## 2021-08-10 NOTE — TELEPHONE ENCOUNTER
Under call intake if you click on the Therapy [facility] GI it should take you to the Renflexis therapy plan. It should say sign plan in the top right. Left message for patient to let him know we received message regarding orders.

## 2021-08-11 RX ORDER — DIPHENHYDRAMINE HYDROCHLORIDE 50 MG/ML
25 INJECTION INTRAMUSCULAR; INTRAVENOUS ONCE
Status: CANCELLED | OUTPATIENT
Start: 2021-08-31

## 2021-08-11 RX ORDER — ACETAMINOPHEN 325 MG/1
650 TABLET ORAL EVERY 6 HOURS PRN
Status: CANCELLED | OUTPATIENT
Start: 2021-08-31

## 2021-08-11 RX ORDER — ACETAMINOPHEN 325 MG/1
650 TABLET ORAL ONCE
Status: CANCELLED | OUTPATIENT
Start: 2021-08-31

## 2021-08-18 ENCOUNTER — TELEPHONE (OUTPATIENT)
Dept: HEMATOLOGY/ONCOLOGY | Facility: HOSPITAL | Age: 36
End: 2021-08-18

## 2021-08-18 NOTE — TELEPHONE ENCOUNTER
Called [facility] center [facility]) [phone], spoke with Aries Anguiano, informed that patient already has authorization until 12/31/2021, under referral # [id number]. I will close out referral #[id number] needed.

## 2021-08-18 NOTE — TELEPHONE ENCOUNTER
Left a message letting Robertlatha know we received new order. Let him also know I scheduled his next appointment. If the date or time does not work please call back to reschedule.

## 2021-09-22 ENCOUNTER — OFFICE VISIT (OUTPATIENT)
Dept: HEMATOLOGY/ONCOLOGY | Facility: HOSPITAL | Age: 36
End: 2021-09-22
Attending: INTERNAL MEDICINE
Payer: COMMERCIAL

## 2021-09-22 VITALS
SYSTOLIC BLOOD PRESSURE: 91 MMHG | WEIGHT: 173 LBS | OXYGEN SATURATION: 98 % | RESPIRATION RATE: 16 BRPM | DIASTOLIC BLOOD PRESSURE: 57 MMHG | HEIGHT: 74 IN | TEMPERATURE: 98 F | HEART RATE: 57 BPM | BODY MASS INDEX: 22.2 KG/M2

## 2021-09-22 DIAGNOSIS — K51.811 OTHER ULCERATIVE COLITIS WITH RECTAL BLEEDING (HCC): Primary | ICD-10-CM

## 2021-09-22 PROCEDURE — 96413 CHEMO IV INFUSION 1 HR: CPT

## 2021-09-22 PROCEDURE — 96375 TX/PRO/DX INJ NEW DRUG ADDON: CPT

## 2021-09-22 PROCEDURE — 96415 CHEMO IV INFUSION ADDL HR: CPT

## 2021-09-22 RX ORDER — ACETAMINOPHEN 325 MG/1
TABLET ORAL
Status: COMPLETED
Start: 2021-09-22 | End: 2021-09-22

## 2021-09-22 RX ORDER — DIPHENHYDRAMINE HYDROCHLORIDE 50 MG/ML
25 INJECTION INTRAMUSCULAR; INTRAVENOUS ONCE
Status: COMPLETED | OUTPATIENT
Start: 2021-09-22 | End: 2021-09-22

## 2021-09-22 RX ORDER — ACETAMINOPHEN 325 MG/1
650 TABLET ORAL EVERY 6 HOURS PRN
Status: CANCELLED | OUTPATIENT
Start: 2021-11-17

## 2021-09-22 RX ORDER — ACETAMINOPHEN 325 MG/1
650 TABLET ORAL ONCE
Status: COMPLETED | OUTPATIENT
Start: 2021-09-22 | End: 2021-09-22

## 2021-09-22 RX ORDER — DIPHENHYDRAMINE HYDROCHLORIDE 50 MG/ML
INJECTION INTRAMUSCULAR; INTRAVENOUS
Status: COMPLETED
Start: 2021-09-22 | End: 2021-09-22

## 2021-09-22 RX ORDER — DIPHENHYDRAMINE HYDROCHLORIDE 50 MG/ML
25 INJECTION INTRAMUSCULAR; INTRAVENOUS ONCE
Status: CANCELLED | OUTPATIENT
Start: 2021-11-17

## 2021-09-22 RX ORDER — ACETAMINOPHEN 325 MG/1
650 TABLET ORAL ONCE
Status: CANCELLED | OUTPATIENT
Start: 2021-11-17

## 2021-09-22 RX ADMIN — ACETAMINOPHEN 650 MG: 325 TABLET ORAL at 08:54:00

## 2021-09-22 RX ADMIN — DIPHENHYDRAMINE HYDROCHLORIDE 25 MG: 50 INJECTION INTRAMUSCULAR; INTRAVENOUS at 09:01:00

## 2021-09-22 NOTE — PROGRESS NOTES
Presents for renflexis ordered every 8 weeks. Premedications given, PIV established with brisk blood return noted to right forearm and  site covered during infusion per patient preference. Tolerating tx, no concerns voiced.     Renflexis infused with no

## 2021-11-10 ENCOUNTER — OFFICE VISIT (OUTPATIENT)
Dept: HEMATOLOGY/ONCOLOGY | Facility: HOSPITAL | Age: 36
End: 2021-11-10
Attending: INTERNAL MEDICINE
Payer: COMMERCIAL

## 2021-11-10 VITALS
SYSTOLIC BLOOD PRESSURE: 112 MMHG | RESPIRATION RATE: 16 BRPM | HEART RATE: 64 BPM | DIASTOLIC BLOOD PRESSURE: 57 MMHG | TEMPERATURE: 98 F | OXYGEN SATURATION: 99 % | WEIGHT: 182.19 LBS | BODY MASS INDEX: 23 KG/M2

## 2021-11-10 DIAGNOSIS — K51.811 OTHER ULCERATIVE COLITIS WITH RECTAL BLEEDING (HCC): Primary | ICD-10-CM

## 2021-11-10 PROCEDURE — 96375 TX/PRO/DX INJ NEW DRUG ADDON: CPT

## 2021-11-10 PROCEDURE — 96415 CHEMO IV INFUSION ADDL HR: CPT

## 2021-11-10 PROCEDURE — 96413 CHEMO IV INFUSION 1 HR: CPT

## 2021-11-10 RX ORDER — DIPHENHYDRAMINE HYDROCHLORIDE 50 MG/ML
25 INJECTION INTRAMUSCULAR; INTRAVENOUS ONCE
Status: COMPLETED | OUTPATIENT
Start: 2021-11-10 | End: 2021-11-10

## 2021-11-10 RX ORDER — ACETAMINOPHEN 325 MG/1
650 TABLET ORAL ONCE
Status: CANCELLED | OUTPATIENT
Start: 2021-11-17

## 2021-11-10 RX ORDER — ACETAMINOPHEN 325 MG/1
TABLET ORAL
Status: COMPLETED
Start: 2021-11-10 | End: 2021-11-10

## 2021-11-10 RX ORDER — ACETAMINOPHEN 325 MG/1
650 TABLET ORAL EVERY 6 HOURS PRN
OUTPATIENT
Start: 2021-11-17

## 2021-11-10 RX ORDER — DIPHENHYDRAMINE HYDROCHLORIDE 50 MG/ML
INJECTION INTRAMUSCULAR; INTRAVENOUS
Status: COMPLETED
Start: 2021-11-10 | End: 2021-11-10

## 2021-11-10 RX ORDER — ACETAMINOPHEN 325 MG/1
650 TABLET ORAL ONCE
Status: COMPLETED | OUTPATIENT
Start: 2021-11-10 | End: 2021-11-10

## 2021-11-10 RX ORDER — DIPHENHYDRAMINE HYDROCHLORIDE 50 MG/ML
25 INJECTION INTRAMUSCULAR; INTRAVENOUS ONCE
Status: CANCELLED | OUTPATIENT
Start: 2021-11-17

## 2021-11-10 RX ADMIN — ACETAMINOPHEN 650 MG: 325 TABLET ORAL at 08:44:00

## 2021-11-10 RX ADMIN — DIPHENHYDRAMINE HYDROCHLORIDE 25 MG: 50 INJECTION INTRAMUSCULAR; INTRAVENOUS at 08:44:00

## 2022-01-05 ENCOUNTER — OFFICE VISIT (OUTPATIENT)
Dept: HEMATOLOGY/ONCOLOGY | Facility: HOSPITAL | Age: 37
End: 2022-01-05
Attending: INTERNAL MEDICINE
Payer: COMMERCIAL

## 2022-01-05 VITALS
WEIGHT: 182 LBS | DIASTOLIC BLOOD PRESSURE: 69 MMHG | RESPIRATION RATE: 16 BRPM | OXYGEN SATURATION: 100 % | BODY MASS INDEX: 23 KG/M2 | SYSTOLIC BLOOD PRESSURE: 110 MMHG | TEMPERATURE: 98 F | HEART RATE: 67 BPM

## 2022-01-05 DIAGNOSIS — K51.811 OTHER ULCERATIVE COLITIS WITH RECTAL BLEEDING (HCC): Primary | ICD-10-CM

## 2022-01-05 PROCEDURE — 96415 CHEMO IV INFUSION ADDL HR: CPT

## 2022-01-05 PROCEDURE — 96413 CHEMO IV INFUSION 1 HR: CPT

## 2022-01-05 PROCEDURE — 96375 TX/PRO/DX INJ NEW DRUG ADDON: CPT

## 2022-01-05 RX ORDER — DIPHENHYDRAMINE HYDROCHLORIDE 50 MG/ML
INJECTION INTRAMUSCULAR; INTRAVENOUS
Status: COMPLETED
Start: 2022-01-05 | End: 2022-01-05

## 2022-01-05 RX ORDER — DIPHENHYDRAMINE HYDROCHLORIDE 50 MG/ML
25 INJECTION INTRAMUSCULAR; INTRAVENOUS ONCE
OUTPATIENT
Start: 2022-01-12

## 2022-01-05 RX ORDER — DIPHENHYDRAMINE HYDROCHLORIDE 50 MG/ML
25 INJECTION INTRAMUSCULAR; INTRAVENOUS ONCE
Status: COMPLETED | OUTPATIENT
Start: 2022-01-05 | End: 2022-01-05

## 2022-01-05 RX ORDER — ACETAMINOPHEN 325 MG/1
650 TABLET ORAL ONCE
Status: COMPLETED | OUTPATIENT
Start: 2022-01-05 | End: 2022-01-05

## 2022-01-05 RX ORDER — ACETAMINOPHEN 325 MG/1
TABLET ORAL
Status: COMPLETED
Start: 2022-01-05 | End: 2022-01-05

## 2022-01-05 RX ORDER — ACETAMINOPHEN 325 MG/1
650 TABLET ORAL ONCE
OUTPATIENT
Start: 2022-01-12

## 2022-01-05 RX ORDER — ACETAMINOPHEN 325 MG/1
650 TABLET ORAL EVERY 6 HOURS PRN
OUTPATIENT
Start: 2022-01-12

## 2022-01-05 RX ADMIN — ACETAMINOPHEN 650 MG: 325 TABLET ORAL at 09:05:00

## 2022-01-05 RX ADMIN — DIPHENHYDRAMINE HYDROCHLORIDE 25 MG: 50 INJECTION INTRAMUSCULAR; INTRAVENOUS at 09:02:00

## 2022-03-02 ENCOUNTER — OFFICE VISIT (OUTPATIENT)
Dept: HEMATOLOGY/ONCOLOGY | Facility: HOSPITAL | Age: 37
End: 2022-03-02
Attending: INTERNAL MEDICINE
Payer: COMMERCIAL

## 2022-03-02 VITALS
OXYGEN SATURATION: 98 % | WEIGHT: 189.19 LBS | HEART RATE: 68 BPM | RESPIRATION RATE: 16 BRPM | TEMPERATURE: 97 F | SYSTOLIC BLOOD PRESSURE: 127 MMHG | DIASTOLIC BLOOD PRESSURE: 69 MMHG | BODY MASS INDEX: 24 KG/M2

## 2022-03-02 DIAGNOSIS — K51.811 OTHER ULCERATIVE COLITIS WITH RECTAL BLEEDING (HCC): Primary | ICD-10-CM

## 2022-03-02 PROCEDURE — 96413 CHEMO IV INFUSION 1 HR: CPT

## 2022-03-02 PROCEDURE — 96415 CHEMO IV INFUSION ADDL HR: CPT

## 2022-03-02 RX ORDER — ACETAMINOPHEN 325 MG/1
650 TABLET ORAL EVERY 6 HOURS PRN
OUTPATIENT
Start: 2022-03-09

## 2022-03-02 RX ORDER — ACETAMINOPHEN 325 MG/1
650 TABLET ORAL ONCE
Status: COMPLETED | OUTPATIENT
Start: 2022-03-02 | End: 2022-03-02

## 2022-03-02 RX ORDER — ACETAMINOPHEN 325 MG/1
650 TABLET ORAL ONCE
OUTPATIENT
Start: 2022-03-09

## 2022-03-02 RX ORDER — DIPHENHYDRAMINE HYDROCHLORIDE 50 MG/ML
25 INJECTION INTRAMUSCULAR; INTRAVENOUS ONCE
Status: DISCONTINUED | OUTPATIENT
Start: 2022-03-02 | End: 2022-03-02

## 2022-03-02 RX ORDER — DIPHENHYDRAMINE HCL 25 MG
25 CAPSULE ORAL ONCE
Start: 2022-04-27 | End: 2022-04-27

## 2022-03-02 RX ORDER — DIPHENHYDRAMINE HCL 25 MG
CAPSULE ORAL
Status: COMPLETED
Start: 2022-03-02 | End: 2022-03-02

## 2022-03-02 RX ORDER — ACETAMINOPHEN 325 MG/1
TABLET ORAL
Status: COMPLETED
Start: 2022-03-02 | End: 2022-03-02

## 2022-03-02 RX ORDER — DIPHENHYDRAMINE HCL 25 MG
25 CAPSULE ORAL ONCE
Status: COMPLETED | OUTPATIENT
Start: 2022-03-02 | End: 2022-03-02

## 2022-03-02 RX ORDER — DIPHENHYDRAMINE HYDROCHLORIDE 50 MG/ML
25 INJECTION INTRAMUSCULAR; INTRAVENOUS ONCE
Status: CANCELLED | OUTPATIENT
Start: 2022-03-09

## 2022-03-02 RX ADMIN — ACETAMINOPHEN 650 MG: 325 TABLET ORAL at 09:12:00

## 2022-03-02 RX ADMIN — DIPHENHYDRAMINE HCL 25 MG: 25 MG CAPSULE ORAL at 09:13:00

## 2022-03-02 NOTE — PROGRESS NOTES
Pt tolerated Renflexis infusion well today without incident or complaint. Benadryl 25 mg PO given to pt; pt states he has always had this in the past. Okay receieved from [facility] RN/Dr. Juan Miguel Jaimes to change current benadryl order to PO for future doses.      Education Record    Learner:  Patient    Disease / Diagnosis: ulcerative colitis     Barriers / Limitations:  None   Comments:    Method:  Discussion   Comments:    General Topics:  Plan of care reviewed   Comments:    Outcome:  Shows understanding   Comments:

## 2022-04-27 ENCOUNTER — OFFICE VISIT (OUTPATIENT)
Dept: HEMATOLOGY/ONCOLOGY | Facility: HOSPITAL | Age: 37
End: 2022-04-27
Attending: INTERNAL MEDICINE
Payer: COMMERCIAL

## 2022-04-27 VITALS
HEART RATE: 75 BPM | RESPIRATION RATE: 16 BRPM | OXYGEN SATURATION: 100 % | DIASTOLIC BLOOD PRESSURE: 55 MMHG | TEMPERATURE: 98 F | SYSTOLIC BLOOD PRESSURE: 98 MMHG

## 2022-04-27 DIAGNOSIS — K51.811 OTHER ULCERATIVE COLITIS WITH RECTAL BLEEDING (HCC): Primary | ICD-10-CM

## 2022-04-27 PROCEDURE — 96413 CHEMO IV INFUSION 1 HR: CPT

## 2022-04-27 PROCEDURE — 96415 CHEMO IV INFUSION ADDL HR: CPT

## 2022-04-27 RX ORDER — DIPHENHYDRAMINE HCL 25 MG
CAPSULE ORAL
Status: COMPLETED
Start: 2022-04-27 | End: 2022-04-27

## 2022-04-27 RX ORDER — DIPHENHYDRAMINE HCL 25 MG
25 CAPSULE ORAL ONCE
Status: COMPLETED | OUTPATIENT
Start: 2022-04-27 | End: 2022-04-27

## 2022-04-27 RX ORDER — ACETAMINOPHEN 325 MG/1
650 TABLET ORAL ONCE
OUTPATIENT
Start: 2022-05-04

## 2022-04-27 RX ORDER — DIPHENHYDRAMINE HCL 25 MG
25 CAPSULE ORAL ONCE
Start: 2022-05-04 | End: 2022-05-04

## 2022-04-27 RX ORDER — ACETAMINOPHEN 325 MG/1
TABLET ORAL
Status: COMPLETED
Start: 2022-04-27 | End: 2022-04-27

## 2022-04-27 RX ORDER — ACETAMINOPHEN 325 MG/1
650 TABLET ORAL EVERY 6 HOURS PRN
OUTPATIENT
Start: 2022-05-04

## 2022-04-27 RX ORDER — ACETAMINOPHEN 325 MG/1
650 TABLET ORAL ONCE
Status: COMPLETED | OUTPATIENT
Start: 2022-04-27 | End: 2022-04-27

## 2022-04-27 RX ADMIN — DIPHENHYDRAMINE HCL 25 MG: 25 MG CAPSULE ORAL at 09:04:00

## 2022-04-27 RX ADMIN — ACETAMINOPHEN 650 MG: 325 TABLET ORAL at 09:03:00

## 2022-04-27 NOTE — PROGRESS NOTES
Presents for renflexis ordered every 8 weeks. Premedications given- po tylenol/benadryl- was switched to PO benadryl with last treatment. Patient tolerated infusion without complications. Post vitals stable. Returns in 8 weeks with appointment made, AVS given to pt. Next infusion is last treatment - will need new order to continue infusions. Discussed with patient.

## 2022-06-06 ENCOUNTER — TELEPHONE (OUTPATIENT)
Dept: GASTROENTEROLOGY | Facility: CLINIC | Age: 37
End: 2022-06-06

## 2022-06-06 NOTE — TELEPHONE ENCOUNTER
PPD Team    Wife called stating PA needed for infusions. It looks like patient is approved for 5 doses from April until December 2022 is this correct? I just want to verify so I do not misinform the patient.     Thank you

## 2022-06-08 NOTE — TELEPHONE ENCOUNTER
I do not have a verbal release to speak to [facility]. Left message for patient to call back.

## 2022-06-08 NOTE — TELEPHONE ENCOUNTER
Yes, he has 5 total approved, used first on 4/27/2022 and has 4 left until 12/31/2022. Thank you for checking.

## 2022-06-22 ENCOUNTER — APPOINTMENT (OUTPATIENT)
Dept: HEMATOLOGY/ONCOLOGY | Facility: HOSPITAL | Age: 37
End: 2022-06-22
Attending: INTERNAL MEDICINE
Payer: COMMERCIAL

## 2022-06-23 ENCOUNTER — APPOINTMENT (OUTPATIENT)
Dept: HEMATOLOGY/ONCOLOGY | Facility: HOSPITAL | Age: 37
End: 2022-06-23
Attending: INTERNAL MEDICINE
Payer: COMMERCIAL

## 2022-07-14 ENCOUNTER — OFFICE VISIT (OUTPATIENT)
Dept: HEMATOLOGY/ONCOLOGY | Facility: HOSPITAL | Age: 37
End: 2022-07-14
Attending: INTERNAL MEDICINE
Payer: COMMERCIAL

## 2022-07-14 VITALS
WEIGHT: 186.63 LBS | DIASTOLIC BLOOD PRESSURE: 73 MMHG | RESPIRATION RATE: 16 BRPM | HEART RATE: 56 BPM | SYSTOLIC BLOOD PRESSURE: 115 MMHG | TEMPERATURE: 98 F | OXYGEN SATURATION: 99 % | BODY MASS INDEX: 24 KG/M2

## 2022-07-14 DIAGNOSIS — K51.811 OTHER ULCERATIVE COLITIS WITH RECTAL BLEEDING (HCC): Primary | ICD-10-CM

## 2022-07-14 PROCEDURE — 96413 CHEMO IV INFUSION 1 HR: CPT

## 2022-07-14 PROCEDURE — 96415 CHEMO IV INFUSION ADDL HR: CPT

## 2022-07-14 RX ORDER — DIPHENHYDRAMINE HCL 25 MG
25 CAPSULE ORAL ONCE
Status: CANCELLED
Start: 2022-08-25 | End: 2022-08-25

## 2022-07-14 RX ORDER — DIPHENHYDRAMINE HCL 25 MG
25 CAPSULE ORAL ONCE
Status: COMPLETED | OUTPATIENT
Start: 2022-07-14 | End: 2022-07-14

## 2022-07-14 RX ORDER — ACETAMINOPHEN 325 MG/1
TABLET ORAL
Status: COMPLETED
Start: 2022-07-14 | End: 2022-07-14

## 2022-07-14 RX ORDER — ACETAMINOPHEN 325 MG/1
650 TABLET ORAL ONCE
Status: COMPLETED | OUTPATIENT
Start: 2022-07-14 | End: 2022-07-14

## 2022-07-14 RX ORDER — ACETAMINOPHEN 325 MG/1
650 TABLET ORAL EVERY 6 HOURS PRN
OUTPATIENT
Start: 2022-08-25

## 2022-07-14 RX ORDER — ACETAMINOPHEN 325 MG/1
650 TABLET ORAL ONCE
Status: CANCELLED | OUTPATIENT
Start: 2022-08-25

## 2022-07-14 RX ORDER — DIPHENHYDRAMINE HCL 25 MG
CAPSULE ORAL
Status: COMPLETED
Start: 2022-07-14 | End: 2022-07-14

## 2022-07-14 RX ADMIN — DIPHENHYDRAMINE HCL 25 MG: 25 MG CAPSULE ORAL at 12:30:00

## 2022-07-14 RX ADMIN — ACETAMINOPHEN 650 MG: 325 TABLET ORAL at 12:30:00

## 2022-07-14 NOTE — PROGRESS NOTES
Presents for renflexis ordered every 8 weeks. Premedications given- po tylenol/benadryl  Patient tolerated infusion without complications. Post vitals stable. He is aware of the need for a new order before we are able to schedule his next appointment which is due on 9/8. He has a follow up with Dr. Rosario Boogie on 9/9. He knows he needs to call the office.

## 2022-09-09 ENCOUNTER — TELEPHONE (OUTPATIENT)
Dept: GASTROENTEROLOGY | Facility: CLINIC | Age: 37
End: 2022-09-09

## 2022-09-09 ENCOUNTER — LAB ENCOUNTER (OUTPATIENT)
Dept: LAB | Facility: HOSPITAL | Age: 37
End: 2022-09-09
Attending: INTERNAL MEDICINE
Payer: COMMERCIAL

## 2022-09-09 ENCOUNTER — OFFICE VISIT (OUTPATIENT)
Dept: GASTROENTEROLOGY | Facility: CLINIC | Age: 37
End: 2022-09-09
Payer: COMMERCIAL

## 2022-09-09 VITALS
SYSTOLIC BLOOD PRESSURE: 117 MMHG | HEART RATE: 83 BPM | DIASTOLIC BLOOD PRESSURE: 89 MMHG | HEIGHT: 74 IN | BODY MASS INDEX: 24.38 KG/M2 | WEIGHT: 190 LBS

## 2022-09-09 DIAGNOSIS — K51.919 ULCERATIVE COLITIS WITH COMPLICATION, UNSPECIFIED LOCATION (HCC): Primary | ICD-10-CM

## 2022-09-09 DIAGNOSIS — K51.811 OTHER ULCERATIVE COLITIS WITH RECTAL BLEEDING (HCC): ICD-10-CM

## 2022-09-09 DIAGNOSIS — Z87.19 HISTORY OF ULCERATIVE COLITIS: Primary | ICD-10-CM

## 2022-09-09 DIAGNOSIS — K51.919 ULCERATIVE COLITIS WITH COMPLICATION, UNSPECIFIED LOCATION (HCC): ICD-10-CM

## 2022-09-09 LAB
ALBUMIN SERPL-MCNC: 3.9 G/DL (ref 3.4–5)
ALBUMIN/GLOB SERPL: 1 {RATIO} (ref 1–2)
ALP LIVER SERPL-CCNC: 53 U/L
ALT SERPL-CCNC: 20 U/L
ANION GAP SERPL CALC-SCNC: 7 MMOL/L (ref 0–18)
AST SERPL-CCNC: 15 U/L (ref 15–37)
BASOPHILS # BLD AUTO: 0.07 X10(3) UL (ref 0–0.2)
BASOPHILS NFR BLD AUTO: 0.8 %
BILIRUB SERPL-MCNC: 0.6 MG/DL (ref 0.1–2)
BUN BLD-MCNC: 11 MG/DL (ref 7–18)
BUN/CREAT SERPL: 8.4 (ref 10–20)
CALCIUM BLD-MCNC: 8.8 MG/DL (ref 8.5–10.1)
CHLORIDE SERPL-SCNC: 104 MMOL/L (ref 98–112)
CO2 SERPL-SCNC: 29 MMOL/L (ref 21–32)
CREAT BLD-MCNC: 1.31 MG/DL
CRP SERPL-MCNC: 0.31 MG/DL (ref ?–0.3)
DEPRECATED RDW RBC AUTO: 42.2 FL (ref 35.1–46.3)
EOSINOPHIL # BLD AUTO: 0.07 X10(3) UL (ref 0–0.7)
EOSINOPHIL NFR BLD AUTO: 0.8 %
ERYTHROCYTE [DISTWIDTH] IN BLOOD BY AUTOMATED COUNT: 13.5 % (ref 11–15)
FASTING STATUS PATIENT QL REPORTED: YES
GFR SERPLBLD BASED ON 1.73 SQ M-ARVRAT: 72 ML/MIN/1.73M2 (ref 60–?)
GLOBULIN PLAS-MCNC: 3.9 G/DL (ref 2.8–4.4)
GLUCOSE BLD-MCNC: 92 MG/DL (ref 70–99)
HCT VFR BLD AUTO: 44.7 %
HGB BLD-MCNC: 14.3 G/DL
IMM GRANULOCYTES # BLD AUTO: 0.04 X10(3) UL (ref 0–1)
IMM GRANULOCYTES NFR BLD: 0.5 %
LYMPHOCYTES # BLD AUTO: 2.11 X10(3) UL (ref 1–4)
LYMPHOCYTES NFR BLD AUTO: 23.9 %
MCH RBC QN AUTO: 27.1 PG (ref 26–34)
MCHC RBC AUTO-ENTMCNC: 32 G/DL (ref 31–37)
MCV RBC AUTO: 84.7 FL
MONOCYTES # BLD AUTO: 0.49 X10(3) UL (ref 0.1–1)
MONOCYTES NFR BLD AUTO: 5.5 %
NEUTROPHILS # BLD AUTO: 6.05 X10 (3) UL (ref 1.5–7.7)
NEUTROPHILS # BLD AUTO: 6.05 X10(3) UL (ref 1.5–7.7)
NEUTROPHILS NFR BLD AUTO: 68.5 %
OSMOLALITY SERPL CALC.SUM OF ELEC: 289 MOSM/KG (ref 275–295)
PLATELET # BLD AUTO: 293 10(3)UL (ref 150–450)
POTASSIUM SERPL-SCNC: 3.6 MMOL/L (ref 3.5–5.1)
PROT SERPL-MCNC: 7.8 G/DL (ref 6.4–8.2)
RBC # BLD AUTO: 5.28 X10(6)UL
SODIUM SERPL-SCNC: 140 MMOL/L (ref 136–145)
TSI SER-ACNC: 0.8 MIU/ML (ref 0.36–3.74)
VIT B12 SERPL-MCNC: 328 PG/ML (ref 193–986)
VIT D+METAB SERPL-MCNC: 11.8 NG/ML (ref 30–100)
WBC # BLD AUTO: 8.8 X10(3) UL (ref 4–11)

## 2022-09-09 PROCEDURE — 82306 VITAMIN D 25 HYDROXY: CPT

## 2022-09-09 PROCEDURE — 85025 COMPLETE CBC W/AUTO DIFF WBC: CPT

## 2022-09-09 PROCEDURE — 36415 COLL VENOUS BLD VENIPUNCTURE: CPT

## 2022-09-09 PROCEDURE — 3074F SYST BP LT 130 MM HG: CPT | Performed by: INTERNAL MEDICINE

## 2022-09-09 PROCEDURE — 86140 C-REACTIVE PROTEIN: CPT

## 2022-09-09 PROCEDURE — 3079F DIAST BP 80-89 MM HG: CPT | Performed by: INTERNAL MEDICINE

## 2022-09-09 PROCEDURE — 84443 ASSAY THYROID STIM HORMONE: CPT

## 2022-09-09 PROCEDURE — 99214 OFFICE O/P EST MOD 30 MIN: CPT | Performed by: INTERNAL MEDICINE

## 2022-09-09 PROCEDURE — 82607 VITAMIN B-12: CPT

## 2022-09-09 PROCEDURE — 80053 COMPREHEN METABOLIC PANEL: CPT

## 2022-09-09 PROCEDURE — 3008F BODY MASS INDEX DOCD: CPT | Performed by: INTERNAL MEDICINE

## 2022-09-09 RX ORDER — AZATHIOPRINE 50 MG/1
50 TABLET ORAL DAILY
Qty: 90 TABLET | Refills: 0 | Status: SHIPPED | OUTPATIENT
Start: 2022-09-09 | End: 2022-12-08

## 2022-09-09 NOTE — TELEPHONE ENCOUNTER
Scheduled for:  Colonoscopy 91363  Provider Name:  Dr Grayson Hernandez  Date:  2/7/2023  Location:  [facility]  Sedation:  MAC  Time:  0800 (pt is aware to arrive at 0700)  Prep: Single Colyte    Meds/Allergies Reconciled?:  Physician reviewed  Diagnosis with codes:  Ulcerative Colitis K51.919  Was patient informed to call insurance with codes (Y/N):  Y     Referral sent?:  NA  [facility] or [address] notified?:  I sent an electronic request to Endo Scheduling and received a confirmation today. Medication Orders:  Pt is aware to NOT take iron pills, herbal meds and diet supplements for 7 days before exam. Also to NOT take any form of alcohol, recreational drugs and any forms of ED meds 24 hours before exam.     Misc Orders:       Further instructions given by staff:  I discussed the prep intructions with the patient in office which HE verbally understood. Copy of instructions was handed to patient as well. Patient was also advised he will receive a call from PAT nurse 72-24 hours prior procedure to schedule Covid test done.

## 2022-09-09 NOTE — PATIENT INSTRUCTIONS
1. Schedule colonoscopy with MAC [Diagnosis: hx of ulcerative ]    2.  bowel prep from pharmacy (single dose Golytely)    3. Continue all medications for procedure    4. Read all bowel prep instructions carefully     5. AVOID seeds, nuts, popcorn, raw fruits and vegetables (cooked is okay) for 2-3 days before procedure    6. You MAY need to go for COVID testing 72 hours before procedure. The testing team will call you a few days before your procedure to discuss with you if testing is required. If you are asked to go for COVID testing and do not completed the test, the procedure cannot be performed. 7. If you start any NEW medication after your visit today, please notify us. Certain medications will need to be held before the procedure, or the procedure cannot be performed. 8. Restart imuran at 50mg/day    9. Continue infusion therapy (infliximab biosimilar) every 8 weeks    10.  Blood work

## 2022-09-15 ENCOUNTER — TELEPHONE (OUTPATIENT)
Dept: GASTROENTEROLOGY | Facility: CLINIC | Age: 37
End: 2022-09-15

## 2022-09-15 RX ORDER — ERGOCALCIFEROL 1.25 MG/1
50000 CAPSULE ORAL
Qty: 8 CAPSULE | Refills: 0 | Status: SHIPPED | OUTPATIENT
Start: 2022-09-15 | End: 2022-11-04

## 2022-09-15 NOTE — TELEPHONE ENCOUNTER
Discussed with patient regarding low vitamin D, replacement sent. He needs to  Imuran 50 mg a day and start. Labs reviewed creatinine mildly elevated but stable from past.      GI Staff:   Patient has been slightly delayed on his infusion for infliximab bio similar, can you please call him and see how he can restart the infusion therapy. Hx of UC.

## 2022-09-15 NOTE — TELEPHONE ENCOUNTER
Dr. Lisbeth Dillard,    Per previous infusion note, patient was to let us know that he needs a new order placed for renflexis. Please review and sign off on therapy plan if appropriate, thank you!

## 2022-09-16 RX ORDER — ACETAMINOPHEN 325 MG/1
650 TABLET ORAL EVERY 6 HOURS PRN
Status: CANCELLED | OUTPATIENT
Start: 2022-09-16

## 2022-09-19 RX ORDER — ACETAMINOPHEN 325 MG/1
650 TABLET ORAL EVERY 6 HOURS PRN
OUTPATIENT
Start: 2022-09-19

## 2022-09-19 RX ORDER — DIPHENHYDRAMINE HCL 25 MG
25 CAPSULE ORAL ONCE
Status: CANCELLED | OUTPATIENT
Start: 2022-09-19

## 2022-09-19 RX ORDER — ACETAMINOPHEN 325 MG/1
650 TABLET ORAL ONCE
Status: CANCELLED | OUTPATIENT
Start: 2022-09-19

## 2022-09-19 NOTE — TELEPHONE ENCOUNTER
Dr. Campbell Gain    The order in [address] [place] [address] Rd is still coming up as . I removed the old plan that is  and pended a new therapy plan for Renflexis 5mg/kg every 8 weeks to review and sign if appropriate.     Thank you

## 2022-09-19 NOTE — TELEPHONE ENCOUNTER
Patient was authorized 5 units every 8 weeks until 12/31/2022, he has only used 2, last dose 7/14/2022. He still has 3 units left until 12/31/2022. The new order still needs to be signed by MD.  Thank you.

## 2022-09-19 NOTE — TELEPHONE ENCOUNTER
I called the infusion center and spoke to Kurt Batres since he is overdue for infusion (last done on 7/14/2022 per Epic). Aware per below patient has authorization for the next few infusions and new order placed for Reflexis.

## 2022-09-21 NOTE — TELEPHONE ENCOUNTER
Your appointments     Date & Time Appointment Department [facility])    Sep 26, 2022 12:30 PM CDT Infusion [facility] with [facility]

## 2022-09-26 ENCOUNTER — TELEPHONE (OUTPATIENT)
Dept: GASTROENTEROLOGY | Facility: CLINIC | Age: 37
End: 2022-09-26

## 2022-09-26 ENCOUNTER — OFFICE VISIT (OUTPATIENT)
Dept: HEMATOLOGY/ONCOLOGY | Facility: HOSPITAL | Age: 37
End: 2022-09-26
Attending: INTERNAL MEDICINE

## 2022-09-26 VITALS
RESPIRATION RATE: 16 BRPM | HEART RATE: 61 BPM | DIASTOLIC BLOOD PRESSURE: 62 MMHG | HEIGHT: 74.02 IN | SYSTOLIC BLOOD PRESSURE: 109 MMHG | WEIGHT: 193 LBS | TEMPERATURE: 98 F | OXYGEN SATURATION: 98 % | BODY MASS INDEX: 24.77 KG/M2

## 2022-09-26 DIAGNOSIS — K51.811 OTHER ULCERATIVE COLITIS WITH RECTAL BLEEDING (HCC): Primary | ICD-10-CM

## 2022-09-26 PROCEDURE — 96413 CHEMO IV INFUSION 1 HR: CPT

## 2022-09-26 PROCEDURE — 96415 CHEMO IV INFUSION ADDL HR: CPT

## 2022-09-26 RX ORDER — ACETAMINOPHEN 325 MG/1
TABLET ORAL
Status: COMPLETED
Start: 2022-09-26 | End: 2022-09-26

## 2022-09-26 RX ORDER — ACETAMINOPHEN 325 MG/1
650 TABLET ORAL ONCE
OUTPATIENT
Start: 2022-11-07

## 2022-09-26 RX ORDER — DIPHENHYDRAMINE HCL 25 MG
CAPSULE ORAL
Status: COMPLETED
Start: 2022-09-26 | End: 2022-09-26

## 2022-09-26 RX ORDER — ACETAMINOPHEN 325 MG/1
650 TABLET ORAL EVERY 6 HOURS PRN
OUTPATIENT
Start: 2022-11-07

## 2022-09-26 RX ORDER — DIPHENHYDRAMINE HCL 25 MG
25 CAPSULE ORAL ONCE
OUTPATIENT
Start: 2022-11-07

## 2022-09-26 RX ORDER — ACETAMINOPHEN 325 MG/1
650 TABLET ORAL ONCE
Status: COMPLETED | OUTPATIENT
Start: 2022-09-26 | End: 2022-09-26

## 2022-09-26 RX ORDER — DIPHENHYDRAMINE HCL 25 MG
25 CAPSULE ORAL ONCE
Status: COMPLETED | OUTPATIENT
Start: 2022-09-26 | End: 2022-09-26

## 2022-09-26 RX ADMIN — ACETAMINOPHEN 650 MG: 325 TABLET ORAL at 12:02:00

## 2022-09-26 RX ADMIN — DIPHENHYDRAMINE HCL 25 MG: 25 MG CAPSULE ORAL at 12:02:00

## 2022-09-26 NOTE — TELEPHONE ENCOUNTER
No need for repeat testing at this time. Unless it is required for insurance reasons. There are currently no recommendations from gastroenterology societies that define any reasoning or need to perform surveillance TB testing.

## 2022-09-26 NOTE — TELEPHONE ENCOUNTER
Dr. Francie Hurst    Do you want patient to complete TB testing since last was in 2020?       Thank you

## 2022-09-26 NOTE — PROGRESS NOTES
Presents for renflexis ordered every 8 weeks. Premedications given as ordered- po tylenol/benadryl. Patient tolerated infusion without complications. Post vitals stable. Please note pt last TB screening was in 2020-MD's nurse aware but ok to proceed today, will readdress with MD and pt at next office visit. AVS provided with appointments. PIV dc'd and wrapped in coban.

## 2022-09-29 ENCOUNTER — TELEPHONE (OUTPATIENT)
Dept: GASTROENTEROLOGY | Facility: CLINIC | Age: 37
End: 2022-09-29

## 2022-11-21 ENCOUNTER — APPOINTMENT (OUTPATIENT)
Dept: HEMATOLOGY/ONCOLOGY | Facility: HOSPITAL | Age: 37
End: 2022-11-21
Attending: INTERNAL MEDICINE
Payer: COMMERCIAL

## 2022-12-01 ENCOUNTER — OFFICE VISIT (OUTPATIENT)
Dept: HEMATOLOGY/ONCOLOGY | Facility: HOSPITAL | Age: 37
End: 2022-12-01
Attending: INTERNAL MEDICINE
Payer: COMMERCIAL

## 2022-12-01 VITALS
OXYGEN SATURATION: 98 % | SYSTOLIC BLOOD PRESSURE: 123 MMHG | RESPIRATION RATE: 16 BRPM | HEART RATE: 66 BPM | DIASTOLIC BLOOD PRESSURE: 68 MMHG | BODY MASS INDEX: 25 KG/M2 | WEIGHT: 193.81 LBS | TEMPERATURE: 98 F

## 2022-12-01 DIAGNOSIS — K51.811 OTHER ULCERATIVE COLITIS WITH RECTAL BLEEDING (HCC): Primary | ICD-10-CM

## 2022-12-01 PROCEDURE — 96415 CHEMO IV INFUSION ADDL HR: CPT

## 2022-12-01 PROCEDURE — 96413 CHEMO IV INFUSION 1 HR: CPT

## 2022-12-01 RX ORDER — DIPHENHYDRAMINE HCL 25 MG
25 CAPSULE ORAL ONCE
Status: COMPLETED | OUTPATIENT
Start: 2022-12-01 | End: 2022-12-01

## 2022-12-01 RX ORDER — ACETAMINOPHEN 325 MG/1
650 TABLET ORAL ONCE
OUTPATIENT
Start: 2023-01-05

## 2022-12-01 RX ORDER — ACETAMINOPHEN 325 MG/1
TABLET ORAL
Status: COMPLETED
Start: 2022-12-01 | End: 2022-12-01

## 2022-12-01 RX ORDER — ACETAMINOPHEN 325 MG/1
650 TABLET ORAL EVERY 6 HOURS PRN
OUTPATIENT
Start: 2023-01-05

## 2022-12-01 RX ORDER — DIPHENHYDRAMINE HCL 25 MG
25 CAPSULE ORAL ONCE
OUTPATIENT
Start: 2023-01-05

## 2022-12-01 RX ORDER — DIPHENHYDRAMINE HCL 25 MG
CAPSULE ORAL
Status: COMPLETED
Start: 2022-12-01 | End: 2022-12-01

## 2022-12-01 RX ORDER — ACETAMINOPHEN 325 MG/1
650 TABLET ORAL ONCE
Status: COMPLETED | OUTPATIENT
Start: 2022-12-01 | End: 2022-12-01

## 2022-12-01 RX ADMIN — ACETAMINOPHEN 650 MG: 325 TABLET ORAL at 11:10:00

## 2022-12-01 RX ADMIN — DIPHENHYDRAMINE HCL 25 MG: 25 MG CAPSULE ORAL at 11:10:00

## 2023-01-16 ENCOUNTER — APPOINTMENT (OUTPATIENT)
Dept: HEMATOLOGY/ONCOLOGY | Facility: HOSPITAL | Age: 38
End: 2023-01-16
Attending: INTERNAL MEDICINE
Payer: COMMERCIAL

## 2023-01-25 ENCOUNTER — TELEPHONE (OUTPATIENT)
Dept: HEMATOLOGY/ONCOLOGY | Facility: HOSPITAL | Age: 38
End: 2023-01-25

## 2023-01-25 ENCOUNTER — TELEPHONE (OUTPATIENT)
Facility: CLINIC | Age: 38
End: 2023-01-25

## 2023-01-25 DIAGNOSIS — K51.919 ULCERATIVE COLITIS WITH COMPLICATION, UNSPECIFIED LOCATION (HCC): Primary | ICD-10-CM

## 2023-01-25 NOTE — TELEPHONE ENCOUNTER
Noted 1/25/23 business office telephone encounter. Patient has [employer] which is no longer accepted by [facility]. Patient is currently scheduled for a colonoscopy at [address] [place] [address] on 2/7/23. Patient removed from [facility] and surgical case request entered. Dr. Carla Ty, patient is requesting to speak to you directly. He was advised he would need to find new in network GI provider, but would like to speak to you. He has been canceled for infusions as well.

## 2023-01-25 NOTE — TELEPHONE ENCOUNTER
Spoke with Jessica at Dr. Libertad Nunez office to notify that this patient still have [employer] insurance, which is no longer accepted by [facility]. Patient was also made aware by infusion  and verbalized understanding. Apts have been cancelled for the [facility], but pt is still scheduled for colonoscopy at [address] [place] [address] on 2/7; Dr. Libertad Nunez office made aware of this.

## 2023-01-25 NOTE — TELEPHONE ENCOUNTER
Pt states the infusion center does not accept his insurance anymore  - asking how he can get his infusions now

## 2023-01-26 ENCOUNTER — APPOINTMENT (OUTPATIENT)
Dept: HEMATOLOGY/ONCOLOGY | Facility: HOSPITAL | Age: 38
End: 2023-01-26
Attending: INTERNAL MEDICINE
Payer: COMMERCIAL

## 2023-01-26 NOTE — TELEPHONE ENCOUNTER
[address], [phone], spoke with Eladia Resendez fron [facility], [phone]. Eladia Resendez referred me to [facility] (part of [address] [place] [address] Infusion Clinic    Address: [address] [facility] [address] [facility]                 Tel. No. [phone]  Called above Infusion clinic in [facility], office is closed, will follow up tomorrow.

## 2023-01-26 NOTE — TELEPHONE ENCOUNTER
Infusion was due today (Renflexis 5mg/kg)- he has been doing well on infusion therapy for his ulcerative colitis. NOT on imuran. He understands due to insurance change, I can no longer see him unfortunately but I will sign off on new infusion center while he finds a new GI doctor.        GI Staff:   Please send order for (Renflexis 5mg/kg) q 8 weeks to this new infusion center which is insurance covers:    [facility] (cancer infusion)   [facility] [address] [facility] [address] [phone]

## 2023-02-03 NOTE — TELEPHONE ENCOUNTER
Spoke to Gordy at [facility] infusion clinic who states they do take new patients. Provided fax number for their location #[phone], #[phone]    Also contacted [address] [place] [address],[address] Floor office for fax #[phone], [phone]    Fax sent to [address] [place] [address],[facility] Floor location with order form, facesheet, recent tb result.

## 2023-02-07 NOTE — TELEPHONE ENCOUNTER
Contacted [facility] and spoke to DAI. States she recieved order but needs additional clinicals. I sent order form again with clinicals to her personal fax at [phone].

## 2023-02-07 NOTE — TELEPHONE ENCOUNTER
Pt is calling because he spoke with [facility] in [address] [place] [address],[address] ([facility]) and they stated that they have not received the fax or order form for his infusion.     Provided fx number but was same as previous one  Fx:

## 2023-02-07 NOTE — TELEPHONE ENCOUNTER
Fax sent again to [address] [place] [address] [address] Will follow up to confirm it was received.

## 2023-02-08 NOTE — TELEPHONE ENCOUNTER
Good Morning Dr Aide Romero and staff    Ref# [id number]    Patients current insurance is with Анна Garcia. [facility] is no longer contracted with this plan as of 1/1/23. Please have  reach out to patient to see if he chose a new plan for 2023 and update in chart.     Thank you    [facility]

## 2023-02-08 NOTE — TELEPHONE ENCOUNTER
Patient is in process of transferring care to another infusion center and they will obtain PA. Does not need approval from our end. Spoke to patient today to let him know [facility]ass should have necessary information and start approval process.

## 2023-02-13 NOTE — TELEPHONE ENCOUNTER
[facility] and was told that they did not receive any info from our office.

## 2023-02-16 NOTE — TELEPHONE ENCOUNTER
Spoke to [place]ΡΝΑΚΑ with [address] States she received necessary documents and confirmed medication is available to give in their clinic. I notified patient of this. Advised he follow up in case they do not reach out to schedule in the next week or so.

## 2023-02-24 ENCOUNTER — TELEPHONE (OUTPATIENT)
Facility: CLINIC | Age: 38
End: 2023-02-24

## 2023-02-24 NOTE — TELEPHONE ENCOUNTER
Ivan Merino 11 and asked for Abena Gross, he was on lunch so will call him back after. I faxed over requested documents to number below.

## 2023-02-24 NOTE — TELEPHONE ENCOUNTER
[facility], they state that pt is scheduled for infusion on Monday. They have question regarding order.   They need dates of last infusion and they are needing the most recent lab results (CBC, CMP, Hep B & C)  Please fax results to [phone] and  Please call

## 2023-02-24 NOTE — TELEPHONE ENCOUNTER
Spoke to [place] states he did not receive fax, I refaxed and he will send the form again on his end.

## 2023-02-24 NOTE — TELEPHONE ENCOUNTER
Spoke to [employer] at [facility]. States he has not yet received recent labs but will keep an eye out. He will also be faxing an ambulatory order form for Dr. Aparna Tsai to fill out and send back. Will need this prior to 2nd infusion with them.

## 2023-02-24 NOTE — TELEPHONE ENCOUNTER
Nataly Valle from [facility] is requesting call back.  Would not specify, just would want a call back

## 2023-10-27 ENCOUNTER — TELEPHONE (OUTPATIENT)
Dept: OTHER | Age: 38
End: 2023-10-27

## 2023-11-02 ENCOUNTER — TELEPHONE (OUTPATIENT)
Dept: OTHER | Age: 38
End: 2023-11-02

## 2024-01-22 ENCOUNTER — TELEPHONE (OUTPATIENT)
Dept: OTHER | Age: 39
End: 2024-01-22

## 2024-01-29 ENCOUNTER — TELEPHONE (OUTPATIENT)
Dept: OTHER | Age: 39
End: 2024-01-29

## 2024-03-07 ENCOUNTER — TELEPHONE (OUTPATIENT)
Facility: CLINIC | Age: 39
End: 2024-03-07

## 2024-03-07 DIAGNOSIS — K51.918 ULCERATIVE COLITIS WITH OTHER COMPLICATION, UNSPECIFIED LOCATION (HCC): Primary | ICD-10-CM

## 2024-03-08 NOTE — TELEPHONE ENCOUNTER
Dr. Rosas,    Patient last seen in office 09/2022. He currently receives his infusions through [facility] and is requesting new order.    It doesn't seem like he found a new GI provider and if he still has the same insurance then he still cannot be seen here.     Do you have any recommendations for the patient?

## 2024-03-10 NOTE — TELEPHONE ENCOUNTER
GI Staff:   Please let patient know that:    He needs to find a provider under his insurance ASAP. I would like to be his provider but unfortunately his insurance has changed. So he needs to find someone in his network.  I will refill his infusion this one time since it is never good to have interruption in his infusion therapy. Okay to give orders for 4 months of infusions (should cover two more infusions). After that we cannot give any more infusion refills.  He should also have labs done (as ordered) as long as he can get it done here

## 2024-03-11 NOTE — TELEPHONE ENCOUNTER
Spoke to patient and relayed message. Patient stated that he was referred to come back to Dr. Rosas. Confirmed patients insurance and states he has [employer]. Confirmed with , insurance is accepted with specialty offices. Patient was unable to provide Member ID at this time due to being at work.     Patient will call back to provide updated insurance information to phone room.    Infusion center was contacted and was informed by Marilyn they do not accept Ambetter insurance. Will send infusion orders to Mauri Hernandez RN advise.

## 2024-03-26 NOTE — TELEPHONE ENCOUNTER
Pt states his insurance has been updated to [employer] and is requesting to speak to Rn about getting the infusion please call

## 2024-03-27 NOTE — TELEPHONE ENCOUNTER
Left message for Kierra/Nurse navigator with [facility] to see if they will accept an order from our office since we are not within same network/group. Direct ext given.

## 2024-03-27 NOTE — TELEPHONE ENCOUNTER
I spoke to patient and obtained information for his current infusion center. I let him know I would contact them to see if they would accept an outside order. I spoke to a representative who let me know the nurse navigator would call me back who could assist me better.    [facility]   [address]   [address]   [place] [address] [phone], [phone]     If Kierra/nurse navigator calls, please transfer.

## 2024-03-27 NOTE — TELEPHONE ENCOUNTER
Dr. Rosas,    I spoke to Raegan New ([phone]) the clinical manager at [facility]. She states we can send an infusion order by fax to her ([phone]). They will work on getting approval. States order would need to be typed/written which our information on it. Patient currently receives Renflexis 5mg/kg q8 with Tylenol and Benadryl PO.    Do you still want to only write the order for 2 visits? Also do you want him to find another GI since it is hard to say if we accept his Ambetter type plan and if we do, our other facilities do not.

## 2024-04-02 NOTE — TELEPHONE ENCOUNTER
Okay to write the Renflexis 5mg/kg q8 for 3 infusions, that should cover him for a few months.    My first preference is that he sees me (able to see me in clinic and for procedures to monitor his IBD), but if his insurance has changed and he cannot be seen here, then yes, he needs to calll his insurance to see what GI network coverage he has, thanks.

## 2024-04-10 ENCOUNTER — TELEPHONE (OUTPATIENT)
Facility: CLINIC | Age: 39
End: 2024-04-10

## 2024-04-10 NOTE — TELEPHONE ENCOUNTER
First reminder letter sent out via My Chart for the following:    Vitamin B12 [2050229] (Order [id number])   Vitamin D [RGG2281] (Order [id number])   C-Reactive Protein [2006627] (Order [id number])   Comp Metabolic Panel (14) [1521312] (Order [id number])   CBC With Differential With Platelet [2005009] (Order [id number])

## 2024-06-14 NOTE — TELEPHONE ENCOUNTER
2nd letter sent out via My Chart for the following:     Vitamin B12 [0305353] (Order [id number])   Vitamin D [NBK8287] (Order [id number])   C-Reactive Protein [2006627] (Order [id number])   Comp Metabolic Panel (14) [7835556] (Order [id number])   CBC With Differential With Platelet [2005009] (Order [id number])

## 2024-09-26 ENCOUNTER — TELEPHONE (OUTPATIENT)
Facility: CLINIC | Age: 39
End: 2024-09-26

## 2024-09-26 NOTE — TELEPHONE ENCOUNTER
Spoke to Izabel and let her know patient was to find gastroenterologist within his network. Previous over was good for 3 treatments. Not seen in over a year. She will notify patient.    MADHURI- Dr. Rosas

## 2024-09-26 NOTE — TELEPHONE ENCOUNTER
Izabel/[facility] called to request updated order for Renflexis treatments.  Please fax to [phone]/Attn Izabel.

## (undated) DEVICE — FORCEP RADIAL JAW 4

## (undated) DEVICE — MEDI-VAC NON-CONDUCTIVE SUCTION TUBING 6MM X 1.8M (6FT.) L: Brand: CARDINAL HEALTH

## (undated) DEVICE — 35 ML SYRINGE REGULAR TIP: Brand: MONOJECT

## (undated) DEVICE — LINE MNTR ADLT SET O2 INTMD

## (undated) DEVICE — Device: Brand: CUSTOM PROCEDURE KIT

## (undated) DEVICE — MASK PROC W/VISOR ANTIGLARE

## (undated) NOTE — LETTER
4/10/2024              Naida Boland        [address]         [facility] [address]         Dear Naida,    Our records indicate that the tests ordered for you by DALE Rosas MD  have not been done.  If you have, in fact, already completed the tests or you do not wish to have the tests done, please contact our office at THE NUMBER LISTED BELOW.  Otherwise, please proceed with the testing.       Vitamin B12 [6805767] (Order [id number])   Vitamin D [ULO1403] (Order [id number])   C-Reactive Protein [7758750] (Order [id number])   Comp Metabolic Panel (14) [2044399] (Order [id number])   CBC With Differential With Platelet [2005009] (Order [id number])    Sincerely,    DALE Rosas MD  [address]  [facility] [id number] [phone]

## (undated) NOTE — LETTER
6/14/2024              Naida Boland        [address]         [facility] [address]         Dear Naida,    Our records indicate that the tests ordered for you by DALE Rosas MD  have not been done.   Vitamin B12 [2549546] (Order [id number])   Vitamin D [RDZ0835] (Order [id number])   C-Reactive Protein [5405271] (Order [id number])   Comp Metabolic Panel (14) [7511287] (Order [id number])   CBC With Differential With Platelet [2005009] (Order [id number])      If you have, in fact, already completed the tests or you do not wish to have the tests done, please contact our office at THE NUMBER LISTED BELOW.  Otherwise, please proceed with the testing.          Sincerely,    DALE Rosas MD  [address]  [facility] [id number] [phone]

## (undated) NOTE — LETTER
12/30/20        Naida Boland  [address] [facility]      Dear Trisha Salas,    Our records indicate that you have outstanding lab work and or testing that was ordered for you and has not yet been completed:    CBC with Diff: [phone]